# Patient Record
Sex: MALE | Race: BLACK OR AFRICAN AMERICAN | NOT HISPANIC OR LATINO | Employment: STUDENT | URBAN - METROPOLITAN AREA
[De-identification: names, ages, dates, MRNs, and addresses within clinical notes are randomized per-mention and may not be internally consistent; named-entity substitution may affect disease eponyms.]

---

## 2019-03-28 ENCOUNTER — COMMUNICATION - HEALTHEAST (OUTPATIENT)
Dept: UROLOGY | Facility: CLINIC | Age: 19
End: 2019-03-28

## 2019-04-05 ENCOUNTER — COMMUNICATION - HEALTHEAST (OUTPATIENT)
Dept: UROLOGY | Facility: CLINIC | Age: 19
End: 2019-04-05

## 2019-06-05 ENCOUNTER — HOSPITAL ENCOUNTER (EMERGENCY)
Facility: CLINIC | Age: 19
Discharge: HOME | End: 2019-06-06
Attending: EMERGENCY MEDICINE | Admitting: EMERGENCY MEDICINE

## 2019-06-05 ENCOUNTER — APPOINTMENT (OUTPATIENT)
Dept: CT IMAGING | Facility: CLINIC | Age: 19
End: 2019-06-05
Attending: EMERGENCY MEDICINE

## 2019-06-05 DIAGNOSIS — N13.4 HYDROURETER ON RIGHT: ICD-10-CM

## 2019-06-05 DIAGNOSIS — N20.1 URETEROLITHIASIS: ICD-10-CM

## 2019-06-05 LAB
ALBUMIN SERPL-MCNC: 4.5 G/DL (ref 3.4–5)
ALP SERPL-CCNC: 74 U/L (ref 40–150)
ALT SERPL W P-5'-P-CCNC: 27 U/L (ref 0–70)
ANION GAP SERPL CALCULATED.3IONS-SCNC: 4 MMOL/L (ref 3–14)
AST SERPL W P-5'-P-CCNC: 16 U/L (ref 0–45)
BASOPHILS # BLD AUTO: 0.1 10E9/L (ref 0–0.2)
BASOPHILS NFR BLD AUTO: 0.8 %
BILIRUB SERPL-MCNC: 0.7 MG/DL (ref 0.2–1.3)
BUN SERPL-MCNC: 10 MG/DL (ref 7–30)
CALCIUM SERPL-MCNC: 8.8 MG/DL (ref 8.5–10.1)
CHLORIDE SERPL-SCNC: 102 MMOL/L (ref 94–109)
CO2 SERPL-SCNC: 32 MMOL/L (ref 20–32)
CREAT SERPL-MCNC: 0.91 MG/DL (ref 0.66–1.25)
DIFFERENTIAL METHOD BLD: NORMAL
EOSINOPHIL # BLD AUTO: 0.1 10E9/L (ref 0–0.7)
EOSINOPHIL NFR BLD AUTO: 1.4 %
ERYTHROCYTE [DISTWIDTH] IN BLOOD BY AUTOMATED COUNT: 11.7 % (ref 10–15)
GFR SERPL CREATININE-BSD FRML MDRD: >90 ML/MIN/{1.73_M2}
GLUCOSE SERPL-MCNC: 116 MG/DL (ref 70–99)
HCT VFR BLD AUTO: 45 % (ref 40–53)
HGB BLD-MCNC: 14.7 G/DL (ref 13.3–17.7)
IMM GRANULOCYTES # BLD: 0 10E9/L (ref 0–0.4)
IMM GRANULOCYTES NFR BLD: 0.1 %
LIPASE SERPL-CCNC: 71 U/L (ref 73–393)
LYMPHOCYTES # BLD AUTO: 1.5 10E9/L (ref 0.8–5.3)
LYMPHOCYTES NFR BLD AUTO: 21 %
MCH RBC QN AUTO: 29 PG (ref 26.5–33)
MCHC RBC AUTO-ENTMCNC: 32.7 G/DL (ref 31.5–36.5)
MCV RBC AUTO: 89 FL (ref 78–100)
MONOCYTES # BLD AUTO: 0.9 10E9/L (ref 0–1.3)
MONOCYTES NFR BLD AUTO: 12.7 %
NEUTROPHILS # BLD AUTO: 4.5 10E9/L (ref 1.6–8.3)
NEUTROPHILS NFR BLD AUTO: 64 %
NRBC # BLD AUTO: 0 10*3/UL
NRBC BLD AUTO-RTO: 0 /100
PLATELET # BLD AUTO: 254 10E9/L (ref 150–450)
POTASSIUM SERPL-SCNC: 3.3 MMOL/L (ref 3.4–5.3)
PROT SERPL-MCNC: 8.1 G/DL (ref 6.8–8.8)
RBC # BLD AUTO: 5.07 10E12/L (ref 4.4–5.9)
SODIUM SERPL-SCNC: 139 MMOL/L (ref 133–144)
WBC # BLD AUTO: 7.1 10E9/L (ref 4–11)

## 2019-06-05 PROCEDURE — 80053 COMPREHEN METABOLIC PANEL: CPT | Performed by: EMERGENCY MEDICINE

## 2019-06-05 PROCEDURE — 96375 TX/PRO/DX INJ NEW DRUG ADDON: CPT | Performed by: EMERGENCY MEDICINE

## 2019-06-05 PROCEDURE — 74176 CT ABD & PELVIS W/O CONTRAST: CPT

## 2019-06-05 PROCEDURE — 83690 ASSAY OF LIPASE: CPT | Performed by: EMERGENCY MEDICINE

## 2019-06-05 PROCEDURE — 96374 THER/PROPH/DIAG INJ IV PUSH: CPT | Performed by: EMERGENCY MEDICINE

## 2019-06-05 PROCEDURE — 99285 EMERGENCY DEPT VISIT HI MDM: CPT | Mod: 25 | Performed by: EMERGENCY MEDICINE

## 2019-06-05 PROCEDURE — 81001 URINALYSIS AUTO W/SCOPE: CPT | Performed by: EMERGENCY MEDICINE

## 2019-06-05 PROCEDURE — 85025 COMPLETE CBC W/AUTO DIFF WBC: CPT | Performed by: EMERGENCY MEDICINE

## 2019-06-05 PROCEDURE — 96361 HYDRATE IV INFUSION ADD-ON: CPT | Performed by: EMERGENCY MEDICINE

## 2019-06-05 PROCEDURE — 25000128 H RX IP 250 OP 636: Performed by: EMERGENCY MEDICINE

## 2019-06-05 PROCEDURE — 99284 EMERGENCY DEPT VISIT MOD MDM: CPT | Mod: Z6 | Performed by: EMERGENCY MEDICINE

## 2019-06-05 RX ORDER — MORPHINE SULFATE 2 MG/ML
4 INJECTION, SOLUTION INTRAMUSCULAR; INTRAVENOUS ONCE
Status: COMPLETED | OUTPATIENT
Start: 2019-06-05 | End: 2019-06-05

## 2019-06-05 RX ORDER — KETOROLAC TROMETHAMINE 30 MG/ML
30 INJECTION, SOLUTION INTRAMUSCULAR; INTRAVENOUS ONCE
Status: COMPLETED | OUTPATIENT
Start: 2019-06-05 | End: 2019-06-05

## 2019-06-05 RX ORDER — ONDANSETRON 2 MG/ML
4 INJECTION INTRAMUSCULAR; INTRAVENOUS ONCE
Status: COMPLETED | OUTPATIENT
Start: 2019-06-05 | End: 2019-06-05

## 2019-06-05 RX ADMIN — SODIUM CHLORIDE 1000 ML: 9 INJECTION, SOLUTION INTRAVENOUS at 22:58

## 2019-06-05 RX ADMIN — KETOROLAC TROMETHAMINE 30 MG: 30 INJECTION, SOLUTION INTRAMUSCULAR; INTRAVENOUS at 21:59

## 2019-06-05 RX ADMIN — ONDANSETRON 4 MG: 2 INJECTION INTRAMUSCULAR; INTRAVENOUS at 22:01

## 2019-06-05 RX ADMIN — MORPHINE SULFATE 4 MG: 2 INJECTION, SOLUTION INTRAMUSCULAR; INTRAVENOUS at 22:14

## 2019-06-05 RX ADMIN — SODIUM CHLORIDE 1000 ML: 9 INJECTION, SOLUTION INTRAVENOUS at 22:04

## 2019-06-05 ASSESSMENT — ENCOUNTER SYMPTOMS
FEVER: 0
NECK STIFFNESS: 0
ARTHRALGIAS: 0
COLOR CHANGE: 0
DIFFICULTY URINATING: 0
SHORTNESS OF BREATH: 0
HEADACHES: 0
VOMITING: 0
EYE REDNESS: 0
CONFUSION: 0
NAUSEA: 1
ABDOMINAL PAIN: 1
HEMATURIA: 0

## 2019-06-05 ASSESSMENT — MIFFLIN-ST. JEOR: SCORE: 1641.75

## 2019-06-05 NOTE — ED AVS SNAPSHOT
[facility], [place], Emergency Department  [address] [place] [id number]  Phone:  [phone]                                    Brendan Hunt   MRN: [id number]    Department:  [facility], Emergency Department   Date of Visit:  6/5/2019           After Visit Summary Signature Page    I have received my discharge instructions, and my questions have been answered. I have discussed any challenges I see with this plan with the nurse or doctor.    ..........................................................................................................................................  Patient/Patient Representative Signature      ..........................................................................................................................................  Patient Representative Print Name and Relationship to Patient    ..................................................               ................................................  Date                                   Time    ..........................................................................................................................................  Reviewed by Signature/Title    ...................................................              ..............................................  Date                                               Time          22EPIC Rev 08/18

## 2019-06-06 VITALS
HEIGHT: 72 IN | DIASTOLIC BLOOD PRESSURE: 71 MMHG | OXYGEN SATURATION: 99 % | WEIGHT: 132 LBS | HEART RATE: 85 BPM | SYSTOLIC BLOOD PRESSURE: 134 MMHG | TEMPERATURE: 98.3 F | BODY MASS INDEX: 17.88 KG/M2 | RESPIRATION RATE: 16 BRPM

## 2019-06-06 LAB
ALBUMIN UR-MCNC: NEGATIVE MG/DL
AMORPH CRY #/AREA URNS HPF: ABNORMAL /HPF
APPEARANCE UR: ABNORMAL
BILIRUB UR QL STRIP: NEGATIVE
COLOR UR AUTO: YELLOW
GLUCOSE UR STRIP-MCNC: NEGATIVE MG/DL
HGB UR QL STRIP: ABNORMAL
KETONES UR STRIP-MCNC: NEGATIVE MG/DL
LEUKOCYTE ESTERASE UR QL STRIP: NEGATIVE
MUCOUS THREADS #/AREA URNS LPF: PRESENT /LPF
NITRATE UR QL: NEGATIVE
PH UR STRIP: 7 PH (ref 5–7)
RBC #/AREA URNS AUTO: 6 /HPF (ref 0–2)
SOURCE: ABNORMAL
SP GR UR STRIP: 1.01 (ref 1–1.03)
UROBILINOGEN UR STRIP-MCNC: NORMAL MG/DL (ref 0–2)
WBC #/AREA URNS AUTO: 1 /HPF (ref 0–5)

## 2019-06-06 RX ORDER — IBUPROFEN 600 MG/1
600 TABLET, FILM COATED ORAL EVERY 6 HOURS PRN
Qty: 30 TABLET | Refills: 0 | Status: SHIPPED | OUTPATIENT
Start: 2019-06-06 | End: 2022-01-02

## 2019-06-06 RX ORDER — TAMSULOSIN HYDROCHLORIDE 0.4 MG/1
0.4 CAPSULE ORAL DAILY
Qty: 10 CAPSULE | Refills: 0 | Status: SHIPPED | OUTPATIENT
Start: 2019-06-06 | End: 2019-06-16

## 2019-06-06 RX ORDER — HYDROCODONE BITARTRATE AND ACETAMINOPHEN 5; 325 MG/1; MG/1
1 TABLET ORAL EVERY 6 HOURS PRN
Qty: 18 TABLET | Refills: 0 | Status: SHIPPED | OUTPATIENT
Start: 2019-06-06 | End: 2019-06-09

## 2019-06-06 RX ORDER — ONDANSETRON 4 MG/1
4 TABLET, ORALLY DISINTEGRATING ORAL EVERY 8 HOURS PRN
Qty: 10 TABLET | Refills: 0 | Status: SHIPPED | OUTPATIENT
Start: 2019-06-06 | End: 2019-06-09

## 2019-06-06 NOTE — ED PROVIDER NOTES
[place] EMERGENCY DEPARTMENT ([facility])  6/05/19    History     Chief Complaint   Patient presents with     Abdominal Pain     HPI  Brendan Hunt is a 19 year old male with no significant past medical history who presents here to the Emergency Department due to RLQ abdominal pain. Patient states that his pain started today between 12 PM-1 PM. Patient describes the pain as cramping and constant. He is complaining of associated dysuria, testicular pain and nausea. Denies vomiting or hematuria. Patient notes that he has seen many kidney stones in the past but has never seen a urologist for these issues.    I have reviewed the Medications, Allergies, Past Medical and Surgical History, and Social History in the [employer] system.    Past Medical History:   Diagnosis Date     Kidney stones        No past surgical history on file.    No family history on file.    Social History     Tobacco Use     Smoking status: Not on file   Substance Use Topics     Alcohol use: Not on file       No current facility-administered medications for this encounter.      Current Outpatient Medications   Medication     HYDROcodone-acetaminophen (NORCO) 5-325 MG tablet     ibuprofen (ADVIL/MOTRIN) 600 MG tablet     ondansetron (ZOFRAN ODT) 4 MG ODT tab     tamsulosin (FLOMAX) 0.4 MG capsule      No Known Allergies      Review of Systems   Constitutional: Negative for fever.   HENT: Negative for congestion.    Eyes: Negative for redness.   Respiratory: Negative for shortness of breath.    Cardiovascular: Negative for chest pain.   Gastrointestinal: Positive for abdominal pain (RLQ) and nausea. Negative for vomiting.   Genitourinary: Positive for testicular pain. Negative for difficulty urinating and hematuria.   Musculoskeletal: Negative for arthralgias and neck stiffness.   Skin: Negative for color change.   Neurological: Negative for headaches.   Psychiatric/Behavioral: Negative for confusion.       Physical Exam   BP:  141/76  Pulse: 91  Temp: 98.3  F (36.8  C)  Resp: 18  Height: 182.9 cm (6')  Weight: 59.9 kg (132 lb)  SpO2: 100 %      Physical Exam   Constitutional: He appears distressed.   HENT:   Head: Atraumatic.   Mouth/Throat: Oropharynx is clear and moist.   Eyes: Pupils are equal, round, and reactive to light. No scleral icterus.   Cardiovascular: Normal rate, regular rhythm, normal heart sounds and intact distal pulses.   Pulmonary/Chest: Effort normal and breath sounds normal. No respiratory distress.   Abdominal: Soft. Bowel sounds are normal. There is tenderness in the right lower quadrant. There is no rigidity and no guarding.   Musculoskeletal: Normal range of motion. He exhibits no edema or tenderness.   Neurological: He is alert. He has normal strength.   Skin: Skin is warm. No rash noted. He is not diaphoretic.   Psychiatric: He has a normal mood and affect. His behavior is normal.   Nursing note and vitals reviewed.      ED Course   9:20 PM  The patient was seen and examined by Dre Ruelas MD in Room [facility].        Procedures            Critical Care time:  none             Labs Ordered and Resulted from Time of ED Arrival Up to the Time of Departure from the ED   COMPREHENSIVE METABOLIC PANEL - Abnormal; Notable for the following components:       Result Value    Potassium 3.3 (*)     Glucose 116 (*)     All other components within normal limits   LIPASE - Abnormal; Notable for the following components:    Lipase 71 (*)     All other components within normal limits   ROUTINE UA WITH MICROSCOPIC - Abnormal; Notable for the following components:    Blood Urine Moderate (*)     RBC Urine 6 (*)     Mucous Urine Present (*)     Amorphous Crystals Few (*)     All other components within normal limits   CBC WITH PLATELETS DIFFERENTIAL   PERIPHERAL IV CATHETER       Results for orders placed or performed during the hospital encounter of 06/05/19 (from the past 24 hour(s))   CBC with platelets differential   Result Value  Ref Range    WBC 7.1 4.0 - 11.0 10e9/L    RBC Count 5.07 4.4 - 5.9 10e12/L    Hemoglobin 14.7 13.3 - 17.7 g/dL    Hematocrit 45.0 40.0 - 53.0 %    MCV 89 78 - 100 fl    MCH 29.0 26.5 - 33.0 pg    MCHC 32.7 31.5 - 36.5 g/dL    RDW 11.7 10.0 - 15.0 %    Platelet Count 254 150 - 450 10e9/L    Diff Method Automated Method     % Neutrophils 64.0 %    % Lymphocytes 21.0 %    % Monocytes 12.7 %    % Eosinophils 1.4 %    % Basophils 0.8 %    % Immature Granulocytes 0.1 %    Nucleated RBCs 0 0 /100    Absolute Neutrophil 4.5 1.6 - 8.3 10e9/L    Absolute Lymphocytes 1.5 0.8 - 5.3 10e9/L    Absolute Monocytes 0.9 0.0 - 1.3 10e9/L    Absolute Eosinophils 0.1 0.0 - 0.7 10e9/L    Absolute Basophils 0.1 0.0 - 0.2 10e9/L    Abs Immature Granulocytes 0.0 0 - 0.4 10e9/L    Absolute Nucleated RBC 0.0    Comprehensive metabolic panel   Result Value Ref Range    Sodium 139 133 - 144 mmol/L    Potassium 3.3 (L) 3.4 - 5.3 mmol/L    Chloride 102 94 - 109 mmol/L    Carbon Dioxide 32 20 - 32 mmol/L    Anion Gap 4 3 - 14 mmol/L    Glucose 116 (H) 70 - 99 mg/dL    Urea Nitrogen 10 7 - 30 mg/dL    Creatinine 0.91 0.66 - 1.25 mg/dL    GFR Estimate >90 >60 mL/min/[1.73_m2]    GFR Estimate If Black >90 >60 mL/min/[1.73_m2]    Calcium 8.8 8.5 - 10.1 mg/dL    Bilirubin Total 0.7 0.2 - 1.3 mg/dL    Albumin 4.5 3.4 - 5.0 g/dL    Protein Total 8.1 6.8 - 8.8 g/dL    Alkaline Phosphatase 74 40 - 150 U/L    ALT 27 0 - 70 U/L    AST 16 0 - 45 U/L   Lipase   Result Value Ref Range    Lipase 71 (L) 73 - 393 U/L   CT Abdomen Pelvis w/o Contrast    Narrative    PRELIMINARY REPORT - The following report is a preliminary  interpretation.      Impression    IMPRESSION: ] Hydronephrosis of the right kidney, with multiple  calyceal nonobstructing stones measuring up to 1 cm. No definite  obstructing stone is seen within the ureter, however there is a  punctate 3 mm stone in the urinary bladder adjacent to the  ureterovesicular junction, with suggestion of adjacent  soft tissue,  perhaps representing inflammatory tissue, which may be contributing to  right-sided obstructive hydroureteronephrosis. No additional acute  findings within the abdomen or pelvis.    UA with Microscopic   Result Value Ref Range    Color Urine Yellow     Appearance Urine Slightly Cloudy     Glucose Urine Negative NEG^Negative mg/dL    Bilirubin Urine Negative NEG^Negative    Ketones Urine Negative NEG^Negative mg/dL    Specific Gravity Urine 1.014 1.003 - 1.035    Blood Urine Moderate (A) NEG^Negative    pH Urine 7.0 5.0 - 7.0 pH    Protein Albumin Urine Negative NEG^Negative mg/dL    Urobilinogen mg/dL Normal 0.0 - 2.0 mg/dL    Nitrite Urine Negative NEG^Negative    Leukocyte Esterase Urine Negative NEG^Negative    Source Clean catch urine     WBC Urine 1 0 - 5 /HPF    RBC Urine 6 (H) 0 - 2 /HPF    Mucous Urine Present (A) NEG^Negative /LPF    Amorphous Crystals Few (A) NEG^Negative /HPF     Discussed with Urology- will have patient follow-up in clinic.       Assessments & Plan (with Medical Decision Making)   19 year old male with history of kidney stones to the emergency department with right flank and abdominal pain.  The patient does have some hematuria on his urinalysis.  His labs are essentially normal.  He does not have any leukocytosis or elevation of his creatinine level.  The patient has a 3 mm right UVJ stone with severe hydronephrosis and question of soft tissue adjacent to the stone contributing to the hydronephrosis on his abdominal/pelvis CT.  These findings were discussed with urology.  His symptoms are well controlled so will manage an outpatient with ibuprofen, Norco, Flomax, and ondansetron.  The patient will strain his urine and save stone and take to urology follow-up.  Patient given contact information for urology clinic to arrange outpatient follow-up.    I have reviewed the nursing notes.    I have reviewed the findings, diagnosis, plan and need for follow up with the  patient.       Medication List      Started    HYDROcodone-acetaminophen 5-325 MG tablet  Commonly known as:  NORCO  1 tablet, Oral, EVERY 6 HOURS PRN     ibuprofen 600 MG tablet  Commonly known as:  ADVIL/MOTRIN  600 mg, Oral, EVERY 6 HOURS PRN     ondansetron 4 MG ODT tab  Commonly known as:  ZOFRAN ODT  4 mg, Oral, EVERY 8 HOURS PRN     tamsulosin 0.4 MG capsule  Commonly known as:  FLOMAX  0.4 mg, Oral, DAILY            Final diagnoses:   Ureterolithiasis   Hydroureter on right     IMart, am serving as a trained medical scribe to document services personally performed by Dre Ruelas MD, based on the provider's statements to me.   I, Dre Ruelas MD, was physically present and have reviewed and verified the accuracy of this note documented by Mart Hou.    6/5/2019   [facility], [place], EMERGENCY DEPARTMENT     Dre Ruelas MD  06/06/19 0155

## 2019-06-06 NOTE — ED TRIAGE NOTES
H/o kidney stones, was seen at [facility]  Never followed up with urology    Today c/o right sided abdominal pain and right flank pain  Nausea    States he has not taken anything for pain

## 2019-06-06 NOTE — DISCHARGE INSTRUCTIONS
Take ibuprofen as needed for pain.  Take Norco as needed for pain not controlled by ibuprofen.  Do not drive for 8 hours after taking Norco.  Take flomax as directed.  Take ondansetron as needed for nausea.  Strain urine, save stone, and take to Urology follow-up appointment.    Please make an appointment to follow up with Urology Clinic (phone: [phone]) in 1-2 weeks.

## 2019-12-16 ENCOUNTER — OFFICE VISIT - HEALTHEAST (OUTPATIENT)
Dept: INTERNAL MEDICINE | Facility: CLINIC | Age: 19
End: 2019-12-16

## 2019-12-16 DIAGNOSIS — R01.1 HEART MURMUR: ICD-10-CM

## 2019-12-16 DIAGNOSIS — N20.0 NEPHROLITHIASIS: ICD-10-CM

## 2019-12-16 DIAGNOSIS — G43.109 MIGRAINE WITH AURA AND WITHOUT STATUS MIGRAINOSUS, NOT INTRACTABLE: ICD-10-CM

## 2019-12-16 ASSESSMENT — MIFFLIN-ST. JEOR: SCORE: 1552.09

## 2019-12-30 ENCOUNTER — HOSPITAL ENCOUNTER (OUTPATIENT)
Dept: CARDIOLOGY | Facility: CLINIC | Age: 19
Discharge: HOME | End: 2019-12-30
Attending: INTERNAL MEDICINE

## 2019-12-30 DIAGNOSIS — R01.1 HEART MURMUR: ICD-10-CM

## 2019-12-30 LAB
AORTIC ROOT: 2.8 CM
AORTIC VALVE MEAN VELOCITY: 77.3 CM/S
ASCENDING AORTA: 2.2 CM
AV DIMENSIONLESS INDEX VTI: 1
AV MEAN GRADIENT: 3 MMHG
AV PEAK GRADIENT: 5.5 MMHG
AV VALVE AREA: 2.5 CM2
AV VELOCITY RATIO: 1
BSA FOR ECHO PROCEDURE: 1.69 M2
DOP CALC AO PEAK VEL: 117 CM/S
DOP CALC AO VTI: 23.3 CM
DOP CALC LVOT AREA: 2.54 CM2
DOP CALC LVOT DIAMETER: 1.8 CM
DOP CALC LVOT PEAK VEL: 120 CM/S
DOP CALC LVOT STROKE VOLUME: 58.5 CM3
DOP CALCLVOT PEAK VEL VTI: 23 CM
EJECTION FRACTION: 62 % (ref 55–75)
FRACTIONAL SHORTENING: 39.4 % (ref 28–44)
INTERVENTRICULAR SEPTUM IN END DIASTOLE: 0.87 CM (ref 0.6–1)
IVS/PW RATIO: 1
LA AREA 1: 10.1 CM2
LA AREA 2: 9.9 CM2
LEFT ATRIUM LENGTH: 4.2 CM
LEFT ATRIUM SIZE: 2.6 CM
LEFT ATRIUM TO AORTIC ROOT RATIO: 0.93 NO UNITS
LEFT ATRIUM VOLUME INDEX: 12 ML/M2
LEFT ATRIUM VOLUME: 20.2 ML
LEFT VENTRICLE CARDIAC INDEX: 2.6 L/MIN/M2
LEFT VENTRICLE CARDIAC OUTPUT: 4.3 L/MIN
LEFT VENTRICLE DIASTOLIC VOLUME INDEX: 43.5 CM3/M2 (ref 34–74)
LEFT VENTRICLE DIASTOLIC VOLUME: 73.5 CM3 (ref 62–150)
LEFT VENTRICLE HEART RATE: 74 BPM
LEFT VENTRICLE MASS INDEX: 59.5 G/M2
LEFT VENTRICLE SYSTOLIC VOLUME INDEX: 16.3 CM3/M2 (ref 11–31)
LEFT VENTRICLE SYSTOLIC VOLUME: 27.6 CM3 (ref 21–61)
LEFT VENTRICULAR INTERNAL DIMENSION IN DIASTOLE: 3.88 CM (ref 4.2–5.8)
LEFT VENTRICULAR INTERNAL DIMENSION IN SYSTOLE: 2.35 CM (ref 2.5–4)
LEFT VENTRICULAR MASS: 100.5 G
LEFT VENTRICULAR OUTFLOW TRACT MEAN GRADIENT: 3 MMHG
LEFT VENTRICULAR OUTFLOW TRACT MEAN VELOCITY: 76.5 CM/S
LEFT VENTRICULAR OUTFLOW TRACT PEAK GRADIENT: 6 MMHG
LEFT VENTRICULAR POSTERIOR WALL IN END DIASTOLE: 0.88 CM (ref 0.6–1)
LV STROKE VOLUME INDEX: 34.6 ML/M2
MITRAL VALVE DECELERATION SLOPE: 5040 MM/S2
MITRAL VALVE E/A RATIO: 1.9
MITRAL VALVE PRESSURE HALF-TIME: 60 MS
MV AVERAGE E/E' RATIO: 7 CM/S
MV DECELERATION TIME: 204 MS
MV E'TISSUE VEL-LAT: 16.4 CM/S
MV E'TISSUE VEL-MED: 13.1 CM/S
MV LATERAL E/E' RATIO: 6.3
MV MEDIAL E/E' RATIO: 7.9
MV PEAK A VELOCITY: 55.3 CM/S
MV PEAK E VELOCITY: 103 CM/S
MV VALVE AREA PRESSURE 1/2 METHOD: 3.7 CM2
TRICUSPID REGURGITATION PEAK PRESSURE GRADIENT: 21.7 MMHG
TRICUSPID VALVE ANULAR PLANE SYSTOLIC EXCURSION: 2 CM
TRICUSPID VALVE PEAK REGURGITANT VELOCITY: 233 CM/S

## 2020-01-08 ENCOUNTER — COMMUNICATION - HEALTHEAST (OUTPATIENT)
Dept: INTERNAL MEDICINE | Facility: CLINIC | Age: 20
End: 2020-01-08

## 2020-02-12 ENCOUNTER — OFFICE VISIT - HEALTHEAST (OUTPATIENT)
Dept: INTERNAL MEDICINE | Facility: CLINIC | Age: 20
End: 2020-02-12

## 2020-02-12 DIAGNOSIS — G43.109 MIGRAINE WITH AURA AND WITHOUT STATUS MIGRAINOSUS, NOT INTRACTABLE: ICD-10-CM

## 2020-02-12 DIAGNOSIS — F41.1 GAD (GENERALIZED ANXIETY DISORDER): ICD-10-CM

## 2020-02-12 DIAGNOSIS — N20.0 NEPHROLITHIASIS: ICD-10-CM

## 2020-02-12 LAB
ALBUMIN UR-MCNC: NEGATIVE MG/DL
APPEARANCE UR: CLEAR
BILIRUB UR QL STRIP: NEGATIVE
COLOR UR AUTO: YELLOW
GLUCOSE UR STRIP-MCNC: NEGATIVE MG/DL
HGB UR QL STRIP: NEGATIVE
KETONES UR STRIP-MCNC: NEGATIVE MG/DL
LEUKOCYTE ESTERASE UR QL STRIP: NEGATIVE
NITRATE UR QL: NEGATIVE
PH UR STRIP: 7 [PH] (ref 5–8)
SP GR UR STRIP: 1.02 (ref 1–1.03)
UROBILINOGEN UR STRIP-ACNC: NORMAL

## 2020-02-12 ASSESSMENT — MIFFLIN-ST. JEOR: SCORE: 1556.45

## 2021-05-27 NOTE — TELEPHONE ENCOUNTER
Message left for patient to call clinic to set up an appointment for kidney stone follow-up for today or tomorrow.  Marleen Caro RN

## 2021-06-04 VITALS
DIASTOLIC BLOOD PRESSURE: 60 MMHG | WEIGHT: 122.4 LBS | HEIGHT: 70 IN | SYSTOLIC BLOOD PRESSURE: 118 MMHG | HEART RATE: 74 BPM | OXYGEN SATURATION: 98 % | BODY MASS INDEX: 17.52 KG/M2

## 2021-06-04 VITALS
DIASTOLIC BLOOD PRESSURE: 72 MMHG | HEART RATE: 60 BPM | WEIGHT: 121.44 LBS | OXYGEN SATURATION: 100 % | SYSTOLIC BLOOD PRESSURE: 112 MMHG | HEIGHT: 70 IN | BODY MASS INDEX: 17.38 KG/M2

## 2021-06-04 NOTE — PROGRESS NOTES
ASSESSMENT AND PLAN:    1. Nephrolithiasis  Asymptomatic.     2. Migraine with aura   His history and exam strongly suggest new onset migraine with aura.  Suspect anxiety disorder and 'stress' play a role.  Will treat as follows:    - escitalopram oxalate (LEXAPRO) 10 MG tablet; Take 1 tablet (10 mg total) by mouth daily.  Dispense: 30 tablet; Refill: 2  - SUMAtriptan (IMITREX) 50 MG tablet; Take 1 tablet (50 mg total) by mouth once as needed for migraine. May repeat once in two hours as needed, no more than 2 in 24 hours.  Dispense: 30 tablet; Refill: 2    3. Heart murmur  Noted on exam today.  Possibly a flow murmur.  Can't exclude a mitral valve murmur which could play a role with migraine.  Will get echocardiogram.   - Echo Complete; Future    Patient Instructions   1. take escitalopram 10 mg po daily in the evening.     2. Use sumatriptan 50 mg by mouth with headache, can repeat in 2 hours.  No more than 2 pills in 24 hours.     3. Echocardiogram.    4.follow up in 2 weeks.     CHIEF COMPLAINT:  Chief Complaint   Patient presents with     Headache     Dizziness     HISTORY OF PRESENT ILLNESS:  Brendan Hunt is a 19 y.o. male presents with concern about headaches.  He has been suffering them for about a year.  They occur almost every other day, and always on awakening.  He feels them on the occipital area mostly, they throb and there are also visual symptoms - moving flashing lights and at times they almost block his vision.  He will feel diaphoresis and nauseated as well.  They resolve, in 1-2 hours with no treatment.  He does not get arm or leg symptoms of numbness or weakness.  He did vomit once.  He does also have fitful sleep and difficulty falling asleep.  He drinks tea daily but no caffeine or alcohol or drug use.  He reports that these headaches limit currently his ability to live his life.      His mother is present with  and they all suggest possibly a family history of migraine in an  "uncle.     REVIEW OF SYSTEMS:   See HPI, all other systems on review are negative.    Past Medical History:   Diagnosis Date     Headache 12/16/2019     Migraine with aura and without status migrainosus, not intractable 12/16/2019     Nephrolithiasis 12/16/2019     Social History     Tobacco Use   Smoking Status Current Every Day Smoker   Smokeless Tobacco Never Used     VITALS:  Vitals:    12/16/19 1331   BP: 112/72   Patient Site: Left Arm   Patient Position: Sitting   Cuff Size: Adult Regular   Pulse: 60   SpO2: 100%   Weight: 121 lb 7 oz (55.1 kg)   Height: 5' 10\" (1.778 m)     Wt Readings from Last 3 Encounters:   12/16/19 121 lb 7 oz (55.1 kg)   03/27/19 123 lb (55.8 kg)     PHYSICAL EXAM:  Constitutional:  In NAD, alert and oriented  HEENT: nose and throat clear, ears normal  EYE: fundi are unremarkable, discs flat  Neck: no significant cervical or axillary adenopathy  Cardiac:  S1 S2 with a soft murmur, sounds late systolic  Lungs: Clear   Abdomen:   Soft, flat and non-tender, without guarding, rebound, or mass.    Neurologic:  Speech clear, no arm or leg weakness, gait normal , romberg is normal, cranial nerves are normal.   Psychiatric:  Mood and behavior appropriate, thinking is clear.     DECISION TO OBTAIN OLD RECORDS AND/OR OBTAIN HISTORY FROM SOMEONE OTHER THAN PATIENT, AND/OR ACCESSING CARE EVERYWHERE):  1  0     REVIEW AND SUMMARIZATION OF OLD RECORDS, AND/OR OBTAINING HISTORY FROM SOMEONE OTHER THAN PATIENT, AND/OR DISCUSSION OF CASE WITH ANOTHER HEALTH CARE PROVIDER:  2 reviewed 9/23/2019 ER evaluation of headaches    REVIEW AND/OR ORDER OF OF CLINICAL LAB TESTS: 1  Reviewed lab testing.     REVIEW AND/OR ORDER OF RADIOLOGY TESTS: 1 reviewed CT head  9/24/2019    REVIEW AND/OR ORDER OF MEDICAL TESTS (EKG/ECHO/COLONOSCOPY/EGD): 1 0    INDEPENDENT  VISUALIZATION OF IMAGE, TRACING, OR SPECIMEN ITSELF (2 EACH):  0     TOTAL: 4    Current Outpatient Medications   Medication Sig Dispense Refill     " escitalopram oxalate (LEXAPRO) 10 MG tablet Take 1 tablet (10 mg total) by mouth daily. 30 tablet 2     oxyCODONE-acetaminophen (PERCOCET/ENDOCET) 5-325 mg per tablet Take 1 tablet by mouth every 6 (six) hours as needed for pain. 13 tablet 0     SUMAtriptan (IMITREX) 50 MG tablet Take 1 tablet (50 mg total) by mouth once as needed for migraine. May repeat once in two hours as needed, no more than 2 in 24 hours. 30 tablet 2     No current facility-administered medications for this visit.      David Pierre MD  Internal Medicine  [facility]

## 2021-06-04 NOTE — PATIENT INSTRUCTIONS - HE
1. take escitalopram 10 mg po daily in the evening.     2. Use sumatriptan 50 mg by mouth with headache, can repeat in 2 hours.  No more than 2 pills in 24 hours.     3. Echocardiogram.    4.follow up in 2 weeks.

## 2021-06-06 NOTE — PATIENT INSTRUCTIONS - HE
1. Take escitalopram 10 mg by mouth daily.     2. Take mobic 7.5 mg one pill daily as needed for pain.     3. Make an appointment to see urology about kidney stones.     4. Follow up with me in 1 months.

## 2021-06-16 PROBLEM — N20.0 NEPHROLITHIASIS: Status: ACTIVE | Noted: 2019-12-16

## 2021-06-16 PROBLEM — G43.109 MIGRAINE WITH AURA AND WITHOUT STATUS MIGRAINOSUS, NOT INTRACTABLE: Status: ACTIVE | Noted: 2019-12-16

## 2021-06-20 NOTE — LETTER
Letter by David Pierre MD at      Author: David Pierre MD Service: -- Author Type: --    Filed:  Encounter Date: 1/8/2020 Status: Signed         Brendan Hunt  [address] [place] [address] [address]  [facility] [address]     January 8, 2020     Dear Mr. Hunt,    Below are the results from your recent visit:    Resulted Orders   Echo Complete   Result Value Ref Range    LV volume diastolic 73.5 62 - 150 cm3    LV volume systolic 27.6 21 - 61 cm3    IVSd 0.871 0.6 - 1.0 cm    LVIDd 3.88 (!) 4.2 - 5.8 cm    LVIDs 2.35 (!) 2.5 - 4.0 cm    LVOT diam 1.8 cm    LVOT mean gradient 3 mmHg    LVOT peak VTI 23 cm    LVOT mean ivan 76.5 cm/s    LVOT peak ivan 120 cm/s    LVOT peak gradient 6 mmHg    LV PWd 0.879 0.6 - 1.0 cm    MV E' lat ivan 16.4 cm/s    MV E' med ivan 13.1 cm/s    AV mean ivan 77.3 cm/s    AV mean gradient 3 mmHg    AV VTI 23.3 cm    AV peak ivan 117 cm/s    AO root 2.8 cm    AO ascending 2.2 cm    LA size 2.6 cm    LA/AO root ratio 0.929 no units    MV decel slope 5,040 mm/s2    MV decel time 204 ms    MV P 1/2 time 60 ms    MV peak A ivan 55.3 cm/s    MV peak E ivan 103 cm/s    TR peak ivan 233 cm/s    IVS/PW ratio 1.0     TR peak gradent 21.7 mmHg    LV FS 39.4 28 - 44 %    Echo LVEF calculated 62 55 - 75 %    LA volume 20.2 mL    LV mass 100.5 g    AV area 2.5 cm2    AV DIM IND ivan 1.0     MV area p 1/2 time 3.7 cm2    MV E/A Ratio 1.9     LVOT area 2.54 cm2    LVOT SV 58.5 cm3    AV peak gradient 5.5 mmHg    LV systolic volume index 16.3 11 - 31 cm3/m2    LV diastolic volume index 43.5 34 - 74 cm3/m2    LA volume index 12.0 mL/m2    LV mass index 59.5 g/m2    BSA 1.69 m2    LV SVi 34.6 ml/m2    TAPSE 2.0 cm    MV med E/e' ratio 7.9     MV lat E/e' ratio 6.3     HR 74 bpm    LV CO 4.3 l/min    LV Ci 2.6 l/min/m2    LA area 2 9.9 cm2    LA area 1 10.1 cm2    MV Avg E/e' Ratio 7.0 cm/s    LA length 4.2 cm    AV DIM IND VTI 1.0     Narrative    1. Normal left ventricular size and systolic performance with a  visually   estimated ejection fraction of 65%.   2. No significant valvular heart disease is identified on this study.   3. Normal right ventricular size and systolic performance.        Your heart ultrasound is normal.  There is no concern about a murmur.  No further testing is needed.     Please call with questions or contact us using MyChart.    Sincerely,        Electronically signed by David Pierre MD

## 2021-12-20 ENCOUNTER — HOSPITAL ENCOUNTER (EMERGENCY)
Facility: CLINIC | Age: 21
Discharge: HOME | End: 2021-12-21
Attending: EMERGENCY MEDICINE | Admitting: EMERGENCY MEDICINE
Payer: COMMERCIAL

## 2021-12-20 DIAGNOSIS — N20.1 URETERAL STONE: ICD-10-CM

## 2021-12-20 DIAGNOSIS — N20.0 NEPHROLITHIASIS: ICD-10-CM

## 2021-12-20 PROCEDURE — 96361 HYDRATE IV INFUSION ADD-ON: CPT

## 2021-12-20 PROCEDURE — 96374 THER/PROPH/DIAG INJ IV PUSH: CPT

## 2021-12-20 PROCEDURE — 96375 TX/PRO/DX INJ NEW DRUG ADDON: CPT

## 2021-12-20 PROCEDURE — 99285 EMERGENCY DEPT VISIT HI MDM: CPT | Mod: 25

## 2021-12-20 RX ORDER — KETOROLAC TROMETHAMINE 15 MG/ML
15 INJECTION, SOLUTION INTRAMUSCULAR; INTRAVENOUS ONCE
Status: COMPLETED | OUTPATIENT
Start: 2021-12-20 | End: 2021-12-21

## 2021-12-20 RX ORDER — KETOROLAC TROMETHAMINE 15 MG/ML
15 INJECTION, SOLUTION INTRAMUSCULAR; INTRAVENOUS ONCE
Status: DISCONTINUED | OUTPATIENT
Start: 2021-12-20 | End: 2021-12-21 | Stop reason: HOSPADM

## 2021-12-20 ASSESSMENT — ENCOUNTER SYMPTOMS
FEVER: 0
VOMITING: 1
DYSURIA: 1
FLANK PAIN: 1
HEMATURIA: 0

## 2021-12-21 ENCOUNTER — APPOINTMENT (OUTPATIENT)
Dept: CT IMAGING | Facility: CLINIC | Age: 21
End: 2021-12-21
Attending: EMERGENCY MEDICINE
Payer: COMMERCIAL

## 2021-12-21 VITALS
DIASTOLIC BLOOD PRESSURE: 84 MMHG | TEMPERATURE: 98.7 F | SYSTOLIC BLOOD PRESSURE: 135 MMHG | RESPIRATION RATE: 16 BRPM | HEART RATE: 81 BPM | OXYGEN SATURATION: 100 %

## 2021-12-21 LAB
ALBUMIN UR-MCNC: 20 MG/DL
AMORPH CRY #/AREA URNS HPF: ABNORMAL /HPF
ANION GAP SERPL CALCULATED.3IONS-SCNC: 8 MMOL/L (ref 3–14)
APPEARANCE UR: ABNORMAL
BASOPHILS # BLD AUTO: 0.1 10E3/UL (ref 0–0.2)
BASOPHILS NFR BLD AUTO: 0 %
BILIRUB UR QL STRIP: NEGATIVE
BUN SERPL-MCNC: 13 MG/DL (ref 7–30)
CALCIUM SERPL-MCNC: 8.3 MG/DL (ref 8.5–10.1)
CHLORIDE BLD-SCNC: 103 MMOL/L (ref 94–109)
CO2 SERPL-SCNC: 27 MMOL/L (ref 20–32)
COLOR UR AUTO: ABNORMAL
CREAT SERPL-MCNC: 0.85 MG/DL (ref 0.66–1.25)
EOSINOPHIL # BLD AUTO: 0.1 10E3/UL (ref 0–0.7)
EOSINOPHIL NFR BLD AUTO: 0 %
ERYTHROCYTE [DISTWIDTH] IN BLOOD BY AUTOMATED COUNT: 11.7 % (ref 10–15)
GFR SERPL CREATININE-BSD FRML MDRD: >90 ML/MIN/1.73M2
GLUCOSE BLD-MCNC: 150 MG/DL (ref 70–99)
GLUCOSE UR STRIP-MCNC: NEGATIVE MG/DL
HCT VFR BLD AUTO: 44 % (ref 40–53)
HGB BLD-MCNC: 14.5 G/DL (ref 13.3–17.7)
HGB UR QL STRIP: NEGATIVE
IMM GRANULOCYTES # BLD: 0 10E3/UL
IMM GRANULOCYTES NFR BLD: 0 %
KETONES UR STRIP-MCNC: NEGATIVE MG/DL
LEUKOCYTE ESTERASE UR QL STRIP: NEGATIVE
LYMPHOCYTES # BLD AUTO: 1.3 10E3/UL (ref 0.8–5.3)
LYMPHOCYTES NFR BLD AUTO: 11 %
MCH RBC QN AUTO: 29.5 PG (ref 26.5–33)
MCHC RBC AUTO-ENTMCNC: 33 G/DL (ref 31.5–36.5)
MCV RBC AUTO: 90 FL (ref 78–100)
MONOCYTES # BLD AUTO: 1 10E3/UL (ref 0–1.3)
MONOCYTES NFR BLD AUTO: 8 %
MUCOUS THREADS #/AREA URNS LPF: PRESENT /LPF
NEUTROPHILS # BLD AUTO: 9.5 10E3/UL (ref 1.6–8.3)
NEUTROPHILS NFR BLD AUTO: 81 %
NITRATE UR QL: NEGATIVE
NRBC # BLD AUTO: 0 10E3/UL
NRBC BLD AUTO-RTO: 0 /100
PH UR STRIP: 6 [PH] (ref 5–7)
PLATELET # BLD AUTO: 211 10E3/UL (ref 150–450)
POTASSIUM BLD-SCNC: 3.4 MMOL/L (ref 3.4–5.3)
RBC # BLD AUTO: 4.91 10E6/UL (ref 4.4–5.9)
RBC URINE: 14 /HPF
SODIUM SERPL-SCNC: 138 MMOL/L (ref 133–144)
SP GR UR STRIP: 1.02 (ref 1–1.03)
UROBILINOGEN UR STRIP-MCNC: NORMAL MG/DL
WBC # BLD AUTO: 11.9 10E3/UL (ref 4–11)
WBC URINE: 2 /HPF

## 2021-12-21 PROCEDURE — 36415 COLL VENOUS BLD VENIPUNCTURE: CPT | Performed by: EMERGENCY MEDICINE

## 2021-12-21 PROCEDURE — 250N000013 HC RX MED GY IP 250 OP 250 PS 637: Performed by: EMERGENCY MEDICINE

## 2021-12-21 PROCEDURE — 250N000011 HC RX IP 250 OP 636: Performed by: EMERGENCY MEDICINE

## 2021-12-21 PROCEDURE — 258N000003 HC RX IP 258 OP 636: Performed by: EMERGENCY MEDICINE

## 2021-12-21 PROCEDURE — 85025 COMPLETE CBC W/AUTO DIFF WBC: CPT | Performed by: EMERGENCY MEDICINE

## 2021-12-21 PROCEDURE — 81001 URINALYSIS AUTO W/SCOPE: CPT | Performed by: EMERGENCY MEDICINE

## 2021-12-21 PROCEDURE — 80048 BASIC METABOLIC PNL TOTAL CA: CPT | Performed by: EMERGENCY MEDICINE

## 2021-12-21 PROCEDURE — 74176 CT ABD & PELVIS W/O CONTRAST: CPT

## 2021-12-21 RX ORDER — ONDANSETRON 4 MG/1
4 TABLET, ORALLY DISINTEGRATING ORAL EVERY 8 HOURS PRN
Qty: 10 TABLET | Refills: 0 | Status: SHIPPED | OUTPATIENT
Start: 2021-12-21 | End: 2021-12-24

## 2021-12-21 RX ORDER — OXYCODONE HYDROCHLORIDE 5 MG/1
5 TABLET ORAL EVERY 6 HOURS PRN
Qty: 6 TABLET | Refills: 0 | Status: SHIPPED | OUTPATIENT
Start: 2021-12-21

## 2021-12-21 RX ORDER — TAMSULOSIN HYDROCHLORIDE 0.4 MG/1
0.4 CAPSULE ORAL DAILY
Qty: 10 CAPSULE | Refills: 0 | Status: SHIPPED | OUTPATIENT
Start: 2021-12-21 | End: 2021-12-31

## 2021-12-21 RX ORDER — ONDANSETRON 2 MG/ML
4 INJECTION INTRAMUSCULAR; INTRAVENOUS ONCE
Status: COMPLETED | OUTPATIENT
Start: 2021-12-21 | End: 2021-12-21

## 2021-12-21 RX ORDER — ACETAMINOPHEN 500 MG
1000 TABLET ORAL ONCE
Status: COMPLETED | OUTPATIENT
Start: 2021-12-21 | End: 2021-12-21

## 2021-12-21 RX ORDER — HYDROMORPHONE HYDROCHLORIDE 1 MG/ML
0.5 INJECTION, SOLUTION INTRAMUSCULAR; INTRAVENOUS; SUBCUTANEOUS ONCE
Status: COMPLETED | OUTPATIENT
Start: 2021-12-21 | End: 2021-12-21

## 2021-12-21 RX ADMIN — HYDROMORPHONE HYDROCHLORIDE 0.5 MG: 1 INJECTION, SOLUTION INTRAMUSCULAR; INTRAVENOUS; SUBCUTANEOUS at 01:48

## 2021-12-21 RX ADMIN — SODIUM CHLORIDE 1000 ML: 9 INJECTION, SOLUTION INTRAVENOUS at 00:11

## 2021-12-21 RX ADMIN — ACETAMINOPHEN 1000 MG: 500 TABLET, FILM COATED ORAL at 02:58

## 2021-12-21 RX ADMIN — ONDANSETRON 4 MG: 2 INJECTION INTRAMUSCULAR; INTRAVENOUS at 01:48

## 2021-12-21 RX ADMIN — KETOROLAC TROMETHAMINE 15 MG: 15 INJECTION, SOLUTION INTRAMUSCULAR; INTRAVENOUS at 00:12

## 2021-12-21 NOTE — ED PROVIDER NOTES
"  History   Chief Complaint:  Flank Pain     HPI   Brendan Newsome is a 21 year old male with history of kidney stone with surgical intervention who presents with right sided flank pain with radiation into his testicles that started about 6 hours ago while he was sitting. He has had several episodes of emesis since onset. States that he took pain medication similar to what he \"takes for headaches\" today. The patient adds that he developed some dysuria yesterday. He mentions that he has had this pain a number of times before that required surgery \"to take out rocks\". Denies any fevers or hematuria. Reports that he usually gets his care from a doctor over the phone.     Review of Systems   Constitutional: Negative for fever.   Gastrointestinal: Positive for vomiting.   Genitourinary: Positive for dysuria, flank pain and testicular pain. Negative for hematuria.   All other systems reviewed and are negative.    Allergies:  The patient has no known allergies.     Medications:  The patient is not currently taking any prescribed medications.    Past Medical History:     Kidney stones    Past Surgical History:    Kidney stone removal    Social History:  The patient presents to the ED with male visitor.     Physical Exam     Patient Vitals for the past 24 hrs:   BP Temp Temp src Pulse Resp SpO2   12/21/21 0538 135/84 98.7  F (37.1  C) -- 81 16 100 %   12/21/21 0515 -- -- -- -- -- 100 %   12/21/21 0500 -- -- -- -- -- 99 %   12/21/21 0445 -- -- -- -- -- 99 %   12/21/21 0430 -- -- -- -- -- 100 %   12/21/21 0415 -- -- -- -- -- 100 %   12/21/21 0400 -- -- -- -- -- 99 %   12/21/21 0345 -- -- -- -- -- 99 %   12/21/21 0330 -- -- -- -- -- 99 %   12/21/21 0315 -- -- -- -- -- 99 %   12/21/21 0300 -- -- -- -- -- 96 %   12/21/21 0245 -- -- -- -- -- 99 %   12/21/21 0230 -- -- -- -- -- 99 %   12/21/21 0215 -- -- -- -- -- 100 %   12/21/21 0200 -- -- -- -- -- 100 %   12/21/21 0145 -- -- -- -- -- 98 %   12/21/21 0130 -- -- -- -- -- 100 % "   12/21/21 0115 -- -- -- -- -- 100 %   12/21/21 0100 -- -- -- -- -- 100 %   12/21/21 0045 -- -- -- -- -- 95 %   12/21/21 0030 -- -- -- -- -- 100 %   12/20/21 2304 138/83 98.2  F (36.8  C) Oral 91 20 99 %       Physical Exam  General:              Well-nourished              Speaking in full sentences  Eyes:              Conjunctiva without injection or scleral icterus  ENT:              Moist mucous membranes              Nares patent              Pinnae normal  Neck:              Full ROM              No stiffness appreciated  Resp:              Lungs CTAB              No crackles, wheezing or audible rubs              Good air movement  CV:                    Normal rate, regular rhythm              S1 and S2 present              No murmur, gallop or rub  GI:              BS present              Abdomen soft without distention              Non-tender to light and deep palpation              Mild R CVA tenderness              No guarding or rebound tenderness  :              Circumcised male              No focal testicular tenderness              No testicular or scrotal swelling              Testicles in vertical lie bilaterally              No overlying skin changes  Skin:              Warm, dry, well perfused              No rashes or open wounds on exposed skin  MSK:              Moves all extremities              No focal deformities or swelling  Neuro:              Alert              Answers questions appropriately              Moves all extremities equally              Gait stable  Psych:              Normal affect, normal mood    Emergency Department Course     Imaging:  CT Abdomen Pelvis w/o Contrast   Final Result   IMPRESSION:    1.  3 mm stone distal right ureter near the UVJ with significant right-sided hydronephrosis. Additional stones within dilated calyces in the right kidney with additional stone material within the right aspect of the urinary bladder.      2.  Constipation.           Report per  radiology    Laboratory:  Labs Ordered and Resulted from Time of ED Arrival to Time of ED Departure   BASIC METABOLIC PANEL - Abnormal       Result Value    Sodium 138      Potassium 3.4      Chloride 103      Carbon Dioxide (CO2) 27      Anion Gap 8      Urea Nitrogen 13      Creatinine 0.85      Calcium 8.3 (*)     Glucose 150 (*)     GFR Estimate >90     ROUTINE UA WITH MICROSCOPIC REFLEX TO CULTURE - Abnormal    Color Urine Light Yellow      Appearance Urine Slightly Cloudy (*)     Glucose Urine Negative      Bilirubin Urine Negative      Ketones Urine Negative      Specific Gravity Urine 1.024      Blood Urine Negative      pH Urine 6.0      Protein Albumin Urine 20  (*)     Urobilinogen Urine Normal      Nitrite Urine Negative      Leukocyte Esterase Urine Negative      Mucus Urine Present (*)     Amorphous Crystals Urine Few (*)     RBC Urine 14 (*)     WBC Urine 2     CBC WITH PLATELETS AND DIFFERENTIAL - Abnormal    WBC Count 11.9 (*)     RBC Count 4.91      Hemoglobin 14.5      Hematocrit 44.0      MCV 90      MCH 29.5      MCHC 33.0      RDW 11.7      Platelet Count 211      % Neutrophils 81      % Lymphocytes 11      % Monocytes 8      % Eosinophils 0      % Basophils 0      % Immature Granulocytes 0      NRBCs per 100 WBC 0      Absolute Neutrophils 9.5 (*)     Absolute Lymphocytes 1.3      Absolute Monocytes 1.0      Absolute Eosinophils 0.1      Absolute Basophils 0.1      Absolute Immature Granulocytes 0.0      Absolute NRBCs 0.0          Emergency Department Course:    Reviewed:  I reviewed nursing notes and vitals    Assessments:  2319 I obtained history and examined the patient as noted above.     0041 I rechecked the patient and explained findings.     0251 I rechecked and updated the patient.     0538 I rechecked the patient.     Interventions:  0011 NS 1L IV  0012 Toradol 15 mg IV  0148 Zofran 4 mg IV  0148 Dilaudid 0.5 mg IV  0258 Tylenol 1000 mg PO    Disposition:  The patient was discharged  to home.     Impression & Plan     Medical Decision Making:  Brendan Newsome is a 21 year old male who presented to the ER for evaluation of flank pain and abdominal pain.  Vital signs on presentation reveal mildly elevated BP, though are otherwise unremarkable.  Differential diagnosis includes nephrolithiasis/renal colic, pyelonephritis, appendicitis, AAA, biliary colic, bowel obstruction, colitis, renal infarction, retroperitoneal disease, and testicular pathology such as torsion, epididymitis, hernia.       Patient's current presentation is felt to be most consistent with renal colic. CT confirms a 3 mm ureteral stone at the UVJ on the right. Patient informed of incidentally noted nephrolithiasis. Renal function is normal/baseline.  CT and lab workup show no other alternative etiology that could be causing his symptoms (e.g., AAA, appendicitis, pyelonephritis). There is no fever or convincing evidence of a urinary tract infection.      On recheck, his pain is controlled with interventions in the ED and he is tolerating POs. I will prescribe supportive medications and Flomax to facilitate stone passage. I have advised him to return for uncontrolled pain, vomiting, fever, or any other concerning symptoms. I also advised to strain his urine to look for a stone and submit it to his primary doctor for lab analysis.  Finally, I have advised follow up with urology within 3-5 days.      Diagnosis:    ICD-10-CM    1. Ureteral stone  N20.1    2. Nephrolithiasis  N20.0        Discharge Medications:  Discharge Medication List as of 12/21/2021  5:49 AM      START taking these medications    Details   ondansetron (ZOFRAN ODT) 4 MG ODT tab Take 1 tablet (4 mg) by mouth every 8 hours as needed, Disp-10 tablet, R-0, Local Print      oxyCODONE (ROXICODONE) 5 MG tablet Take 1 tablet (5 mg) by mouth every 6 hours as needed for pain No driving or operating heavy machinery while taking this medication.  You may take a stool softener  with this medication as it may cause constipation., Disp-6 tablet, R-0, Local Print      tamsulosin (FLOMAX) 0.4 MG capsule Take 1 capsule (0.4 mg) by mouth daily for 10 doses, Disp-10 capsule, R-0, Local Print             Scribe Disclosure:  ROZINA, Marciano Moreno, am serving as a scribe at 11:19 PM on 12/20/2021 to document services personally performed by Yao Vazquez MD based on my observations and the provider's statements to me.           Yao Vazquez MD  12/21/21 0622

## 2022-01-01 ENCOUNTER — HOSPITAL ENCOUNTER (OUTPATIENT)
Facility: CLINIC | Age: 22
Setting detail: OBSERVATION
Discharge: HOME | End: 2022-01-02
Attending: EMERGENCY MEDICINE | Admitting: INTERNAL MEDICINE
Payer: COMMERCIAL

## 2022-01-01 ENCOUNTER — APPOINTMENT (OUTPATIENT)
Dept: CT IMAGING | Facility: CLINIC | Age: 22
End: 2022-01-01
Attending: EMERGENCY MEDICINE
Payer: COMMERCIAL

## 2022-01-01 DIAGNOSIS — N20.0 KIDNEY STONE: ICD-10-CM

## 2022-01-01 LAB
ALBUMIN UR-MCNC: NEGATIVE MG/DL
ANION GAP SERPL CALCULATED.3IONS-SCNC: 3 MMOL/L (ref 3–14)
APPEARANCE UR: ABNORMAL
BILIRUB UR QL STRIP: NEGATIVE
BUN SERPL-MCNC: 11 MG/DL (ref 7–30)
CALCIUM SERPL-MCNC: 9.1 MG/DL (ref 8.5–10.1)
CHLORIDE BLD-SCNC: 103 MMOL/L (ref 94–109)
CO2 SERPL-SCNC: 32 MMOL/L (ref 20–32)
COLOR UR AUTO: YELLOW
CREAT SERPL-MCNC: 0.9 MG/DL (ref 0.66–1.25)
ERYTHROCYTE [DISTWIDTH] IN BLOOD BY AUTOMATED COUNT: 11.7 % (ref 10–15)
GFR SERPL CREATININE-BSD FRML MDRD: >90 ML/MIN/1.73M2
GLUCOSE BLD-MCNC: 104 MG/DL (ref 70–99)
GLUCOSE UR STRIP-MCNC: NEGATIVE MG/DL
HCT VFR BLD AUTO: 45.2 % (ref 40–53)
HGB BLD-MCNC: 14.8 G/DL (ref 13.3–17.7)
HGB UR QL STRIP: NEGATIVE
KETONES UR STRIP-MCNC: NEGATIVE MG/DL
LEUKOCYTE ESTERASE UR QL STRIP: NEGATIVE
MCH RBC QN AUTO: 29.3 PG (ref 26.5–33)
MCHC RBC AUTO-ENTMCNC: 32.7 G/DL (ref 31.5–36.5)
MCV RBC AUTO: 90 FL (ref 78–100)
MUCOUS THREADS #/AREA URNS LPF: PRESENT /LPF
NITRATE UR QL: NEGATIVE
PH UR STRIP: 7.5 [PH] (ref 5–7)
PLATELET # BLD AUTO: 254 10E3/UL (ref 150–450)
POTASSIUM BLD-SCNC: 3.8 MMOL/L (ref 3.4–5.3)
RBC # BLD AUTO: 5.05 10E6/UL (ref 4.4–5.9)
RBC URINE: 7 /HPF
SODIUM SERPL-SCNC: 138 MMOL/L (ref 133–144)
SP GR UR STRIP: 1.02 (ref 1–1.03)
UROBILINOGEN UR STRIP-MCNC: NORMAL MG/DL
WBC # BLD AUTO: 4.1 10E3/UL (ref 4–11)
WBC URINE: <1 /HPF

## 2022-01-01 PROCEDURE — 99285 EMERGENCY DEPT VISIT HI MDM: CPT | Mod: 25

## 2022-01-01 PROCEDURE — 258N000003 HC RX IP 258 OP 636: Performed by: PHYSICIAN ASSISTANT

## 2022-01-01 PROCEDURE — C9803 HOPD COVID-19 SPEC COLLECT: HCPCS

## 2022-01-01 PROCEDURE — 96375 TX/PRO/DX INJ NEW DRUG ADDON: CPT

## 2022-01-01 PROCEDURE — 250N000013 HC RX MED GY IP 250 OP 250 PS 637: Performed by: PHYSICIAN ASSISTANT

## 2022-01-01 PROCEDURE — 85041 AUTOMATED RBC COUNT: CPT | Performed by: NURSE PRACTITIONER

## 2022-01-01 PROCEDURE — 250N000011 HC RX IP 250 OP 636: Performed by: EMERGENCY MEDICINE

## 2022-01-01 PROCEDURE — 87635 SARS-COV-2 COVID-19 AMP PRB: CPT | Performed by: EMERGENCY MEDICINE

## 2022-01-01 PROCEDURE — 36415 COLL VENOUS BLD VENIPUNCTURE: CPT | Performed by: NURSE PRACTITIONER

## 2022-01-01 PROCEDURE — 81001 URINALYSIS AUTO W/SCOPE: CPT | Performed by: EMERGENCY MEDICINE

## 2022-01-01 PROCEDURE — 258N000003 HC RX IP 258 OP 636: Performed by: EMERGENCY MEDICINE

## 2022-01-01 PROCEDURE — 96361 HYDRATE IV INFUSION ADD-ON: CPT

## 2022-01-01 PROCEDURE — 82310 ASSAY OF CALCIUM: CPT | Performed by: NURSE PRACTITIONER

## 2022-01-01 PROCEDURE — 96376 TX/PRO/DX INJ SAME DRUG ADON: CPT

## 2022-01-01 PROCEDURE — G0378 HOSPITAL OBSERVATION PER HR: HCPCS

## 2022-01-01 PROCEDURE — 96374 THER/PROPH/DIAG INJ IV PUSH: CPT

## 2022-01-01 PROCEDURE — 74176 CT ABD & PELVIS W/O CONTRAST: CPT

## 2022-01-01 PROCEDURE — 99219 PR INITIAL OBSERVATION CARE,LEVEL II: CPT | Performed by: PHYSICIAN ASSISTANT

## 2022-01-01 RX ORDER — ACETAMINOPHEN 325 MG/1
650 TABLET ORAL EVERY 6 HOURS PRN
Status: DISCONTINUED | OUTPATIENT
Start: 2022-01-01 | End: 2022-01-03 | Stop reason: HOSPADM

## 2022-01-01 RX ORDER — MORPHINE SULFATE 4 MG/ML
4 INJECTION, SOLUTION INTRAMUSCULAR; INTRAVENOUS
Status: DISCONTINUED | OUTPATIENT
Start: 2022-01-01 | End: 2022-01-01

## 2022-01-01 RX ORDER — LIDOCAINE 40 MG/G
CREAM TOPICAL
Status: DISCONTINUED | OUTPATIENT
Start: 2022-01-01 | End: 2022-01-03 | Stop reason: HOSPADM

## 2022-01-01 RX ORDER — OXYCODONE HYDROCHLORIDE 5 MG/1
5 TABLET ORAL EVERY 4 HOURS PRN
Status: DISCONTINUED | OUTPATIENT
Start: 2022-01-01 | End: 2022-01-03 | Stop reason: HOSPADM

## 2022-01-01 RX ORDER — POLYETHYLENE GLYCOL 3350 17 G/17G
17 POWDER, FOR SOLUTION ORAL DAILY PRN
Status: DISCONTINUED | OUTPATIENT
Start: 2022-01-01 | End: 2022-01-03 | Stop reason: HOSPADM

## 2022-01-01 RX ORDER — HYDROMORPHONE HYDROCHLORIDE 1 MG/ML
.3-.5 INJECTION, SOLUTION INTRAMUSCULAR; INTRAVENOUS; SUBCUTANEOUS
Status: DISCONTINUED | OUTPATIENT
Start: 2022-01-01 | End: 2022-01-03 | Stop reason: HOSPADM

## 2022-01-01 RX ORDER — TAMSULOSIN HYDROCHLORIDE 0.4 MG/1
0.4 CAPSULE ORAL DAILY
Status: DISCONTINUED | OUTPATIENT
Start: 2022-01-02 | End: 2022-01-03 | Stop reason: HOSPADM

## 2022-01-01 RX ORDER — ONDANSETRON 2 MG/ML
4 INJECTION INTRAMUSCULAR; INTRAVENOUS EVERY 30 MIN PRN
Status: DISCONTINUED | OUTPATIENT
Start: 2022-01-01 | End: 2022-01-01

## 2022-01-01 RX ORDER — HYDROMORPHONE HYDROCHLORIDE 1 MG/ML
0.5 INJECTION, SOLUTION INTRAMUSCULAR; INTRAVENOUS; SUBCUTANEOUS
Status: DISCONTINUED | OUTPATIENT
Start: 2022-01-01 | End: 2022-01-01

## 2022-01-01 RX ORDER — KETOROLAC TROMETHAMINE 15 MG/ML
15 INJECTION, SOLUTION INTRAMUSCULAR; INTRAVENOUS ONCE
Status: COMPLETED | OUTPATIENT
Start: 2022-01-01 | End: 2022-01-01

## 2022-01-01 RX ORDER — DIPHENHYDRAMINE HCL 25 MG
25 CAPSULE ORAL EVERY 6 HOURS PRN
Status: DISCONTINUED | OUTPATIENT
Start: 2022-01-01 | End: 2022-01-03 | Stop reason: HOSPADM

## 2022-01-01 RX ORDER — ONDANSETRON 2 MG/ML
4 INJECTION INTRAMUSCULAR; INTRAVENOUS EVERY 6 HOURS PRN
Status: DISCONTINUED | OUTPATIENT
Start: 2022-01-01 | End: 2022-01-03 | Stop reason: HOSPADM

## 2022-01-01 RX ORDER — AMOXICILLIN 250 MG
1 CAPSULE ORAL 2 TIMES DAILY PRN
Status: DISCONTINUED | OUTPATIENT
Start: 2022-01-01 | End: 2022-01-03 | Stop reason: HOSPADM

## 2022-01-01 RX ORDER — ONDANSETRON 4 MG/1
4 TABLET, ORALLY DISINTEGRATING ORAL EVERY 6 HOURS PRN
Status: DISCONTINUED | OUTPATIENT
Start: 2022-01-01 | End: 2022-01-03 | Stop reason: HOSPADM

## 2022-01-01 RX ORDER — SODIUM CHLORIDE 9 MG/ML
INJECTION, SOLUTION INTRAVENOUS CONTINUOUS
Status: DISCONTINUED | OUTPATIENT
Start: 2022-01-01 | End: 2022-01-03 | Stop reason: HOSPADM

## 2022-01-01 RX ORDER — AMOXICILLIN 250 MG
2 CAPSULE ORAL 2 TIMES DAILY PRN
Status: DISCONTINUED | OUTPATIENT
Start: 2022-01-01 | End: 2022-01-03 | Stop reason: HOSPADM

## 2022-01-01 RX ADMIN — SODIUM CHLORIDE 1000 ML: 9 INJECTION, SOLUTION INTRAVENOUS at 19:22

## 2022-01-01 RX ADMIN — KETOROLAC TROMETHAMINE 15 MG: 15 INJECTION, SOLUTION INTRAMUSCULAR; INTRAVENOUS at 19:23

## 2022-01-01 RX ADMIN — ONDANSETRON 4 MG: 2 INJECTION INTRAMUSCULAR; INTRAVENOUS at 19:23

## 2022-01-01 RX ADMIN — HYDROMORPHONE HYDROCHLORIDE 0.5 MG: 1 INJECTION, SOLUTION INTRAMUSCULAR; INTRAVENOUS; SUBCUTANEOUS at 20:06

## 2022-01-01 RX ADMIN — DIPHENHYDRAMINE HYDROCHLORIDE 25 MG: 25 CAPSULE ORAL at 22:47

## 2022-01-01 RX ADMIN — SODIUM CHLORIDE: 900 INJECTION INTRAVENOUS at 22:44

## 2022-01-01 RX ADMIN — HYDROMORPHONE HYDROCHLORIDE 0.5 MG: 1 INJECTION, SOLUTION INTRAMUSCULAR; INTRAVENOUS; SUBCUTANEOUS at 20:46

## 2022-01-01 RX ADMIN — MORPHINE SULFATE 4 MG: 4 INJECTION INTRAVENOUS at 21:46

## 2022-01-01 RX ADMIN — MORPHINE SULFATE 4 MG: 4 INJECTION INTRAVENOUS at 21:25

## 2022-01-01 ASSESSMENT — ENCOUNTER SYMPTOMS
DIFFICULTY URINATING: 1
COUGH: 0
ABDOMINAL PAIN: 1
BACK PAIN: 1
FEVER: 0
DYSURIA: 1
CHILLS: 0
VOMITING: 1
FLANK PAIN: 1

## 2022-01-02 ENCOUNTER — APPOINTMENT (OUTPATIENT)
Dept: GENERAL RADIOLOGY | Facility: CLINIC | Age: 22
End: 2022-01-02
Attending: UROLOGY
Payer: COMMERCIAL

## 2022-01-02 ENCOUNTER — ANESTHESIA (OUTPATIENT)
Dept: SURGERY | Facility: CLINIC | Age: 22
End: 2022-01-02
Payer: COMMERCIAL

## 2022-01-02 ENCOUNTER — ANESTHESIA EVENT (OUTPATIENT)
Dept: SURGERY | Facility: CLINIC | Age: 22
End: 2022-01-02
Payer: COMMERCIAL

## 2022-01-02 LAB
ANION GAP SERPL CALCULATED.3IONS-SCNC: 3 MMOL/L (ref 3–14)
BASOPHILS # BLD AUTO: 0 10E3/UL (ref 0–0.2)
BASOPHILS NFR BLD AUTO: 1 %
BUN SERPL-MCNC: 9 MG/DL (ref 7–30)
CALCIUM SERPL-MCNC: 8.4 MG/DL (ref 8.5–10.1)
CHLORIDE BLD-SCNC: 108 MMOL/L (ref 94–109)
CO2 SERPL-SCNC: 29 MMOL/L (ref 20–32)
CREAT SERPL-MCNC: 0.91 MG/DL (ref 0.66–1.25)
EOSINOPHIL # BLD AUTO: 0.2 10E3/UL (ref 0–0.7)
EOSINOPHIL NFR BLD AUTO: 4 %
ERYTHROCYTE [DISTWIDTH] IN BLOOD BY AUTOMATED COUNT: 11.6 % (ref 10–15)
GFR SERPL CREATININE-BSD FRML MDRD: >90 ML/MIN/1.73M2
GLUCOSE BLD-MCNC: 93 MG/DL (ref 70–99)
HCT VFR BLD AUTO: 41.4 % (ref 40–53)
HGB BLD-MCNC: 13 G/DL (ref 13.3–17.7)
IMM GRANULOCYTES # BLD: 0 10E3/UL
IMM GRANULOCYTES NFR BLD: 0 %
LYMPHOCYTES # BLD AUTO: 1.5 10E3/UL (ref 0.8–5.3)
LYMPHOCYTES NFR BLD AUTO: 35 %
MCH RBC QN AUTO: 29 PG (ref 26.5–33)
MCHC RBC AUTO-ENTMCNC: 31.4 G/DL (ref 31.5–36.5)
MCV RBC AUTO: 92 FL (ref 78–100)
MONOCYTES # BLD AUTO: 0.6 10E3/UL (ref 0–1.3)
MONOCYTES NFR BLD AUTO: 15 %
NEUTROPHILS # BLD AUTO: 1.9 10E3/UL (ref 1.6–8.3)
NEUTROPHILS NFR BLD AUTO: 45 %
NRBC # BLD AUTO: 0 10E3/UL
NRBC BLD AUTO-RTO: 0 /100
PLATELET # BLD AUTO: 217 10E3/UL (ref 150–450)
POTASSIUM BLD-SCNC: 4.1 MMOL/L (ref 3.4–5.3)
RBC # BLD AUTO: 4.48 10E6/UL (ref 4.4–5.9)
SARS-COV-2 RNA RESP QL NAA+PROBE: NEGATIVE
SODIUM SERPL-SCNC: 140 MMOL/L (ref 133–144)
WBC # BLD AUTO: 4.2 10E3/UL (ref 4–11)

## 2022-01-02 PROCEDURE — C1769 GUIDE WIRE: HCPCS | Performed by: UROLOGY

## 2022-01-02 PROCEDURE — 258N000003 HC RX IP 258 OP 636: Performed by: PHYSICIAN ASSISTANT

## 2022-01-02 PROCEDURE — 80048 BASIC METABOLIC PNL TOTAL CA: CPT | Performed by: PHYSICIAN ASSISTANT

## 2022-01-02 PROCEDURE — 272N000001 HC OR GENERAL SUPPLY STERILE: Performed by: UROLOGY

## 2022-01-02 PROCEDURE — G0378 HOSPITAL OBSERVATION PER HR: HCPCS

## 2022-01-02 PROCEDURE — 99203 OFFICE O/P NEW LOW 30 MIN: CPT | Mod: 25 | Performed by: UROLOGY

## 2022-01-02 PROCEDURE — 85025 COMPLETE CBC W/AUTO DIFF WBC: CPT | Performed by: PHYSICIAN ASSISTANT

## 2022-01-02 PROCEDURE — 250N000011 HC RX IP 250 OP 636: Performed by: NURSE ANESTHETIST, CERTIFIED REGISTERED

## 2022-01-02 PROCEDURE — 258N000003 HC RX IP 258 OP 636: Performed by: ANESTHESIOLOGY

## 2022-01-02 PROCEDURE — 250N000013 HC RX MED GY IP 250 OP 250 PS 637: Performed by: ANESTHESIOLOGY

## 2022-01-02 PROCEDURE — 36415 COLL VENOUS BLD VENIPUNCTURE: CPT | Performed by: PHYSICIAN ASSISTANT

## 2022-01-02 PROCEDURE — 99217 PR OBSERVATION CARE DISCHARGE: CPT | Performed by: INTERNAL MEDICINE

## 2022-01-02 PROCEDURE — 250N000011 HC RX IP 250 OP 636: Performed by: ANESTHESIOLOGY

## 2022-01-02 PROCEDURE — 360N000083 HC SURGERY LEVEL 3 W/ FLUORO, PER MIN: Performed by: UROLOGY

## 2022-01-02 PROCEDURE — 710N000012 HC RECOVERY PHASE 2, PER MINUTE: Performed by: UROLOGY

## 2022-01-02 PROCEDURE — 250N000013 HC RX MED GY IP 250 OP 250 PS 637: Performed by: PHYSICIAN ASSISTANT

## 2022-01-02 PROCEDURE — 999N000141 HC STATISTIC PRE-PROCEDURE NURSING ASSESSMENT: Performed by: UROLOGY

## 2022-01-02 PROCEDURE — 258N000001 HC RX 258: Performed by: UROLOGY

## 2022-01-02 PROCEDURE — 250N000009 HC RX 250: Performed by: NURSE ANESTHETIST, CERTIFIED REGISTERED

## 2022-01-02 PROCEDURE — 710N000010 HC RECOVERY PHASE 1, LEVEL 2, PER MIN: Performed by: UROLOGY

## 2022-01-02 PROCEDURE — 250N000009 HC RX 250: Performed by: ANESTHESIOLOGY

## 2022-01-02 PROCEDURE — 999N000179 XR SURGERY CARM FLUORO LESS THAN 5 MIN W STILLS: Mod: TC

## 2022-01-02 PROCEDURE — 82365 CALCULUS SPECTROSCOPY: CPT | Performed by: UROLOGY

## 2022-01-02 PROCEDURE — 370N000017 HC ANESTHESIA TECHNICAL FEE, PER MIN: Performed by: UROLOGY

## 2022-01-02 PROCEDURE — 258N000003 HC RX IP 258 OP 636: Performed by: NURSE ANESTHETIST, CERTIFIED REGISTERED

## 2022-01-02 PROCEDURE — 52351 CYSTOURETERO & OR PYELOSCOPE: CPT | Mod: GC | Performed by: UROLOGY

## 2022-01-02 RX ORDER — SODIUM CHLORIDE, SODIUM LACTATE, POTASSIUM CHLORIDE, CALCIUM CHLORIDE 600; 310; 30; 20 MG/100ML; MG/100ML; MG/100ML; MG/100ML
INJECTION, SOLUTION INTRAVENOUS CONTINUOUS PRN
Status: DISCONTINUED | OUTPATIENT
Start: 2022-01-02 | End: 2022-01-02

## 2022-01-02 RX ORDER — HYDROMORPHONE HCL IN WATER/PF 6 MG/30 ML
0.2 PATIENT CONTROLLED ANALGESIA SYRINGE INTRAVENOUS EVERY 5 MIN PRN
Status: DISCONTINUED | OUTPATIENT
Start: 2022-01-02 | End: 2022-01-02 | Stop reason: HOSPADM

## 2022-01-02 RX ORDER — DICLOFENAC POTASSIUM 50 MG/1
50 TABLET, FILM COATED ORAL 3 TIMES DAILY PRN
Qty: 10 TABLET | Refills: 0 | Status: SHIPPED | OUTPATIENT
Start: 2022-01-02 | End: 2022-01-02

## 2022-01-02 RX ORDER — TAMSULOSIN HYDROCHLORIDE 0.4 MG/1
0.4 CAPSULE ORAL DAILY
Qty: 3 CAPSULE | Refills: 0 | Status: SHIPPED | OUTPATIENT
Start: 2022-01-02 | End: 2022-01-02

## 2022-01-02 RX ORDER — EPHEDRINE SULFATE 50 MG/ML
25 INJECTION, SOLUTION INTRAVENOUS ONCE
Status: COMPLETED | OUTPATIENT
Start: 2022-01-02 | End: 2022-01-02

## 2022-01-02 RX ORDER — DICLOFENAC POTASSIUM 50 MG/1
50 TABLET, FILM COATED ORAL 3 TIMES DAILY PRN
Qty: 10 TABLET | Refills: 0 | Status: ON HOLD | OUTPATIENT
Start: 2022-01-02 | End: 2022-02-15

## 2022-01-02 RX ORDER — PROMETHAZINE HYDROCHLORIDE 25 MG/ML
25 INJECTION INTRAMUSCULAR; INTRAVENOUS ONCE
Status: COMPLETED | OUTPATIENT
Start: 2022-01-02 | End: 2022-01-02

## 2022-01-02 RX ORDER — ONDANSETRON 4 MG/1
4 TABLET, ORALLY DISINTEGRATING ORAL EVERY 30 MIN PRN
Status: DISCONTINUED | OUTPATIENT
Start: 2022-01-02 | End: 2022-01-03 | Stop reason: HOSPADM

## 2022-01-02 RX ORDER — OXYCODONE HYDROCHLORIDE 5 MG/1
5 TABLET ORAL EVERY 4 HOURS PRN
Status: DISCONTINUED | OUTPATIENT
Start: 2022-01-02 | End: 2022-01-03 | Stop reason: HOSPADM

## 2022-01-02 RX ORDER — GLYCOPYRROLATE 0.2 MG/ML
INJECTION, SOLUTION INTRAMUSCULAR; INTRAVENOUS PRN
Status: DISCONTINUED | OUTPATIENT
Start: 2022-01-02 | End: 2022-01-02

## 2022-01-02 RX ORDER — DEXAMETHASONE SODIUM PHOSPHATE 4 MG/ML
INJECTION, SOLUTION INTRA-ARTICULAR; INTRALESIONAL; INTRAMUSCULAR; INTRAVENOUS; SOFT TISSUE PRN
Status: DISCONTINUED | OUTPATIENT
Start: 2022-01-02 | End: 2022-01-02

## 2022-01-02 RX ORDER — NALOXONE HYDROCHLORIDE 0.4 MG/ML
0.2 INJECTION, SOLUTION INTRAMUSCULAR; INTRAVENOUS; SUBCUTANEOUS
Status: DISCONTINUED | OUTPATIENT
Start: 2022-01-02 | End: 2022-01-03 | Stop reason: HOSPADM

## 2022-01-02 RX ORDER — LIDOCAINE 40 MG/G
CREAM TOPICAL
Status: DISCONTINUED | OUTPATIENT
Start: 2022-01-02 | End: 2022-01-02 | Stop reason: HOSPADM

## 2022-01-02 RX ORDER — ONDANSETRON 2 MG/ML
INJECTION INTRAMUSCULAR; INTRAVENOUS PRN
Status: DISCONTINUED | OUTPATIENT
Start: 2022-01-02 | End: 2022-01-02

## 2022-01-02 RX ORDER — ACETAMINOPHEN 325 MG/1
975 TABLET ORAL ONCE
Status: COMPLETED | OUTPATIENT
Start: 2022-01-02 | End: 2022-01-02

## 2022-01-02 RX ORDER — KETOROLAC TROMETHAMINE 30 MG/ML
30 INJECTION, SOLUTION INTRAMUSCULAR; INTRAVENOUS ONCE
Status: COMPLETED | OUTPATIENT
Start: 2022-01-02 | End: 2022-01-02

## 2022-01-02 RX ORDER — AMMONIA INHALANTS 0.04 G/.3ML
INHALANT RESPIRATORY (INHALATION)
Status: DISCONTINUED
Start: 2022-01-02 | End: 2022-01-02 | Stop reason: HOSPADM

## 2022-01-02 RX ORDER — FENTANYL CITRATE 50 UG/ML
25 INJECTION, SOLUTION INTRAMUSCULAR; INTRAVENOUS
Status: DISCONTINUED | OUTPATIENT
Start: 2022-01-02 | End: 2022-01-03 | Stop reason: HOSPADM

## 2022-01-02 RX ORDER — FENTANYL CITRATE 50 UG/ML
INJECTION, SOLUTION INTRAMUSCULAR; INTRAVENOUS PRN
Status: DISCONTINUED | OUTPATIENT
Start: 2022-01-02 | End: 2022-01-02

## 2022-01-02 RX ORDER — PROPOFOL 10 MG/ML
INJECTION, EMULSION INTRAVENOUS PRN
Status: DISCONTINUED | OUTPATIENT
Start: 2022-01-02 | End: 2022-01-02

## 2022-01-02 RX ORDER — CEFAZOLIN SODIUM 2 G/100ML
INJECTION, SOLUTION INTRAVENOUS PRN
Status: DISCONTINUED | OUTPATIENT
Start: 2022-01-02 | End: 2022-01-02

## 2022-01-02 RX ORDER — NALOXONE HYDROCHLORIDE 0.4 MG/ML
0.4 INJECTION, SOLUTION INTRAMUSCULAR; INTRAVENOUS; SUBCUTANEOUS
Status: DISCONTINUED | OUTPATIENT
Start: 2022-01-02 | End: 2022-01-03 | Stop reason: HOSPADM

## 2022-01-02 RX ORDER — SODIUM CHLORIDE, SODIUM LACTATE, POTASSIUM CHLORIDE, CALCIUM CHLORIDE 600; 310; 30; 20 MG/100ML; MG/100ML; MG/100ML; MG/100ML
INJECTION, SOLUTION INTRAVENOUS CONTINUOUS
Status: DISCONTINUED | OUTPATIENT
Start: 2022-01-02 | End: 2022-01-02 | Stop reason: HOSPADM

## 2022-01-02 RX ORDER — LIDOCAINE HYDROCHLORIDE 10 MG/ML
INJECTION, SOLUTION INFILTRATION; PERINEURAL PRN
Status: DISCONTINUED | OUTPATIENT
Start: 2022-01-02 | End: 2022-01-02

## 2022-01-02 RX ORDER — ONDANSETRON 2 MG/ML
4 INJECTION INTRAMUSCULAR; INTRAVENOUS EVERY 30 MIN PRN
Status: DISCONTINUED | OUTPATIENT
Start: 2022-01-02 | End: 2022-01-03 | Stop reason: HOSPADM

## 2022-01-02 RX ORDER — SODIUM CHLORIDE, SODIUM LACTATE, POTASSIUM CHLORIDE, CALCIUM CHLORIDE 600; 310; 30; 20 MG/100ML; MG/100ML; MG/100ML; MG/100ML
INJECTION, SOLUTION INTRAVENOUS CONTINUOUS
Status: DISCONTINUED | OUTPATIENT
Start: 2022-01-02 | End: 2022-01-03 | Stop reason: HOSPADM

## 2022-01-02 RX ORDER — PHENYLEPHRINE HYDROCHLORIDE 10 MG/ML
INJECTION INTRAVENOUS PRN
Status: DISCONTINUED | OUTPATIENT
Start: 2022-01-02 | End: 2022-01-02

## 2022-01-02 RX ORDER — FENTANYL CITRATE 50 UG/ML
25 INJECTION, SOLUTION INTRAMUSCULAR; INTRAVENOUS EVERY 5 MIN PRN
Status: DISCONTINUED | OUTPATIENT
Start: 2022-01-02 | End: 2022-01-02 | Stop reason: HOSPADM

## 2022-01-02 RX ORDER — TAMSULOSIN HYDROCHLORIDE 0.4 MG/1
0.4 CAPSULE ORAL DAILY
Qty: 3 CAPSULE | Refills: 0 | Status: SHIPPED | OUTPATIENT
Start: 2022-01-02 | End: 2022-01-05

## 2022-01-02 RX ORDER — MEPERIDINE HYDROCHLORIDE 25 MG/ML
12.5 INJECTION INTRAMUSCULAR; INTRAVENOUS; SUBCUTANEOUS
Status: DISCONTINUED | OUTPATIENT
Start: 2022-01-02 | End: 2022-01-03 | Stop reason: HOSPADM

## 2022-01-02 RX ADMIN — SODIUM CHLORIDE, POTASSIUM CHLORIDE, SODIUM LACTATE AND CALCIUM CHLORIDE: 600; 310; 30; 20 INJECTION, SOLUTION INTRAVENOUS at 19:37

## 2022-01-02 RX ADMIN — SODIUM CHLORIDE, POTASSIUM CHLORIDE, SODIUM LACTATE AND CALCIUM CHLORIDE: 600; 310; 30; 20 INJECTION, SOLUTION INTRAVENOUS at 18:05

## 2022-01-02 RX ADMIN — EPHEDRINE SULFATE 25 MG: 50 INJECTION INTRAVENOUS at 21:35

## 2022-01-02 RX ADMIN — ACETAMINOPHEN 975 MG: 325 TABLET, FILM COATED ORAL at 20:43

## 2022-01-02 RX ADMIN — CEFAZOLIN SODIUM 2 G: 2 INJECTION, SOLUTION INTRAVENOUS at 18:15

## 2022-01-02 RX ADMIN — DEXAMETHASONE SODIUM PHOSPHATE 4 MG: 4 INJECTION, SOLUTION INTRA-ARTICULAR; INTRALESIONAL; INTRAMUSCULAR; INTRAVENOUS; SOFT TISSUE at 18:10

## 2022-01-02 RX ADMIN — OXYCODONE HYDROCHLORIDE 5 MG: 5 TABLET ORAL at 19:57

## 2022-01-02 RX ADMIN — ONDANSETRON HYDROCHLORIDE 4 MG: 2 INJECTION, SOLUTION INTRAVENOUS at 18:23

## 2022-01-02 RX ADMIN — FENTANYL CITRATE 25 MCG: 50 INJECTION, SOLUTION INTRAMUSCULAR; INTRAVENOUS at 19:38

## 2022-01-02 RX ADMIN — PROPOFOL 200 MG: 10 INJECTION, EMULSION INTRAVENOUS at 18:10

## 2022-01-02 RX ADMIN — GLYCOPYRROLATE 0.2 MG: 0.2 INJECTION, SOLUTION INTRAMUSCULAR; INTRAVENOUS at 18:17

## 2022-01-02 RX ADMIN — KETOROLAC TROMETHAMINE 30 MG: 30 INJECTION, SOLUTION INTRAMUSCULAR at 20:46

## 2022-01-02 RX ADMIN — SODIUM CHLORIDE: 900 INJECTION INTRAVENOUS at 05:23

## 2022-01-02 RX ADMIN — LIDOCAINE HYDROCHLORIDE 50 MG: 10 INJECTION, SOLUTION INFILTRATION; PERINEURAL at 18:10

## 2022-01-02 RX ADMIN — FENTANYL CITRATE 25 MCG: 50 INJECTION, SOLUTION INTRAMUSCULAR; INTRAVENOUS at 19:56

## 2022-01-02 RX ADMIN — FENTANYL CITRATE 100 MCG: 50 INJECTION, SOLUTION INTRAMUSCULAR; INTRAVENOUS at 18:10

## 2022-01-02 RX ADMIN — MIDAZOLAM 2 MG: 1 INJECTION INTRAMUSCULAR; INTRAVENOUS at 18:04

## 2022-01-02 RX ADMIN — TAMSULOSIN HYDROCHLORIDE 0.4 MG: 0.4 CAPSULE ORAL at 08:01

## 2022-01-02 RX ADMIN — PROMETHAZINE HYDROCHLORIDE 25 MG: 25 INJECTION INTRAMUSCULAR; INTRAVENOUS at 21:35

## 2022-01-02 RX ADMIN — PHENYLEPHRINE HYDROCHLORIDE 150 MCG: 10 INJECTION INTRAVENOUS at 18:21

## 2022-01-02 RX ADMIN — ONDANSETRON 4 MG: 2 INJECTION INTRAMUSCULAR; INTRAVENOUS at 20:58

## 2022-01-02 NOTE — ED NOTES
[facility]  ED Nurse Handoff Report    Brendan Hunt is a 22 year old male   ED Chief complaint: Flank Pain  . ED Diagnosis:   Final diagnoses:   Kidney stone     Allergies: No Known Allergies    Code Status: Full Code  Activity level - Baseline/Home:  Independent. Activity Level - Current:   Stand by Assist. Lift room needed: No. Bariatric: No   Needed: No   Isolation: No. Infection: Not Applicable.     Vital Signs:   Vitals:    01/01/22 2030 01/01/22 2045 01/01/22 2100 01/01/22 2115   BP: 125/82 122/68 127/76    Pulse: 80 71 75    Resp:       Temp:       TempSrc:       SpO2: 100% 100% 100% 100%       Cardiac Rhythm:  ,      Pain level:    Patient confused: No. Patient Falls Risk: Yes.   Elimination Status: Has voided   Patient Report - Initial Complaint: Pt here with L flank pain and dysuria since this afternoon. Seen last week with kidney stones. No n/v. ABC intact. . Focused Assessment: resting   Tests Performed:   CT Abdomen Pelvis w/o Contrast   Final Result   IMPRESSION:    1.  Right ureterovesicular junction 3 mm obstructing calculus with severe right hydroureteronephrosis.   2.  Additional small nonobstructing right renal calculi.           . Abnormal Results:   Labs Ordered and Resulted from Time of ED Arrival to Time of ED Departure   BASIC METABOLIC PANEL - Abnormal       Result Value    Sodium 138      Potassium 3.8      Chloride 103      Carbon Dioxide (CO2) 32      Anion Gap 3      Urea Nitrogen 11      Creatinine 0.90      Calcium 9.1      Glucose 104 (*)     GFR Estimate >90     ROUTINE UA WITH MICROSCOPIC REFLEX TO CULTURE - Abnormal    Color Urine Yellow      Appearance Urine Cloudy (*)     Glucose Urine Negative      Bilirubin Urine Negative      Ketones Urine Negative      Specific Gravity Urine 1.018      Blood Urine Negative      pH Urine 7.5 (*)     Protein Albumin Urine Negative      Urobilinogen Urine Normal      Nitrite Urine Negative      Leukocyte Esterase  Urine Negative      Mucus Urine Present (*)     RBC Urine 7 (*)     WBC Urine <1     CBC WITH PLATELETS - Normal    WBC Count 4.1      RBC Count 5.05      Hemoglobin 14.8      Hematocrit 45.2      MCV 90      MCH 29.3      MCHC 32.7      RDW 11.7      Platelet Count 254     COVID-19 VIRUS (CORONAVIRUS) BY PCR     .   Treatments provided: see mar  Family Comments: mom is present  OBS brochure/video discussed/provided to patient:  Yes  ED Medications:   Medications   ondansetron (ZOFRAN) injection 4 mg (4 mg Intravenous Given 1/1/22 1923)   HYDROmorphone (PF) (DILAUDID) injection 0.5 mg (0.5 mg Intravenous Given 1/1/22 2046)   morphine (PF) injection 4 mg (4 mg Intravenous Given 1/1/22 2146)   0.9% sodium chloride BOLUS (0 mLs Intravenous Stopped 1/1/22 2115)   ketorolac (TORADOL) injection 15 mg (15 mg Intravenous Given 1/1/22 1923)     Drips infusing:  No  For the majority of the shift, the patient's behavior Green. Interventions performed were see mar.    Sepsis treatment initiated: No     Patient tested for COVID 19 prior to admission: YES    ED Nurse Name/Phone Number: Ryan Hankins RN,   9:55 PM    RECEIVING UNIT ED HANDOFF REVIEW    Above ED Nurse Handoff Report was reviewed: Yes  Reviewed by: Maria M Tapia RN on January 2, 2022 at 9:09 AM

## 2022-01-02 NOTE — ED PROVIDER NOTES
History   Chief Complaint:  Flank Pain       HPI   Brendan Hunt is a 22 year old male with history of kidney stone with surgical intervention who presents with back pain with radiation through his right flank pain to his abdomen and down his leg. He notes dysuria, difficulty urinating, and vomiting.  He reports that he was seen last week for this and diagnosed with a kidney stone. Mentions that he has been taking the pain medication that this doctor prescribed him. Denies fever, chills, chest pain, or cough.     Review of Systems   Constitutional: Negative for chills and fever.   Respiratory: Negative for cough.    Cardiovascular: Negative for chest pain.   Gastrointestinal: Positive for abdominal pain and vomiting.   Genitourinary: Positive for difficulty urinating, dysuria and flank pain.   Musculoskeletal: Positive for back pain.   All other systems reviewed and are negative.    Allergies:  The patient has no known allergies.     Medications:  The patient is not currently taking any prescribed medications.    Past Medical History:     Hydronephrosis  Pyelonephritis  Urolithiasis  Migraine with aura  Sepsis    Past Surgical History:    Cystoscopy with stent x3  Cystoscopy with ureteral stent removal    Social History:  The patient presents to the ED with his mother.     Physical Exam     Patient Vitals for the past 24 hrs:   BP Temp Temp src Pulse Resp SpO2   01/01/22 2115 -- -- -- -- -- 100 %   01/01/22 2100 127/76 -- -- 75 -- 100 %   01/01/22 2045 122/68 -- -- 71 -- 100 %   01/01/22 2030 125/82 -- -- 80 -- 100 %   01/01/22 2015 -- -- -- -- -- 100 %   01/01/22 2000 121/42 -- -- 85 -- 100 %   01/01/22 1820 120/61 98.5  F (36.9  C) Temporal 67 18 97 %       Physical Exam  General: Appears uncomfortable  Head: The scalp, face, and head appear normal  Eyes: The pupils are equal, round, and reactive to light. Conjunctivae and sclerae are normal  CV: Regular rate and rhythm.   Resp: Lungs are clear without  wheezes or rales. No respiratory distress.   GI: Abdomen is soft, no rigidity, guarding, or rebound. No distension. No tenderness to palpation in any quadrant.  No CVA tenderness   MS: Normal tone. Joints grossly normal without effusions. No asymmetric leg swelling, calf or thigh tenderness.    Skin: No rash or lesions noted. Normal capillary refill noted  Neuro: Speech is normal and fluent. Face is symmetric. Moving all extremities.   Psych:  Normal affect.  Appropriate interactions.    Emergency Department Course     Imaging:  CT Abdomen Pelvis w/o Contrast   Final Result   IMPRESSION:    1.  Right ureterovesicular junction 3 mm obstructing calculus with severe right hydroureteronephrosis.   2.  Additional small nonobstructing right renal calculi.           Report per radiology    Laboratory:  Labs Ordered and Resulted from Time of ED Arrival to Time of ED Departure   BASIC METABOLIC PANEL - Abnormal       Result Value    Sodium 138      Potassium 3.8      Chloride 103      Carbon Dioxide (CO2) 32      Anion Gap 3      Urea Nitrogen 11      Creatinine 0.90      Calcium 9.1      Glucose 104 (*)     GFR Estimate >90     ROUTINE UA WITH MICROSCOPIC REFLEX TO CULTURE - Abnormal    Color Urine Yellow      Appearance Urine Cloudy (*)     Glucose Urine Negative      Bilirubin Urine Negative      Ketones Urine Negative      Specific Gravity Urine 1.018      Blood Urine Negative      pH Urine 7.5 (*)     Protein Albumin Urine Negative      Urobilinogen Urine Normal      Nitrite Urine Negative      Leukocyte Esterase Urine Negative      Mucus Urine Present (*)     RBC Urine 7 (*)     WBC Urine <1     CBC WITH PLATELETS - Normal    WBC Count 4.1      RBC Count 5.05      Hemoglobin 14.8      Hematocrit 45.2      MCV 90      MCH 29.3      MCHC 32.7      RDW 11.7      Platelet Count 254     COVID-19 VIRUS (CORONAVIRUS) BY PCR      Emergency Department Course:    Reviewed:  I reviewed nursing notes, vitals, past medical history  and Care Everywhere    Assessments:  1910 I obtained history and examined the patient as noted above.     2031 I rechecked the patient and explained findings.     2134 I rechecked and updated the patient.     Consults:  2132 I spoke with Yareli CLAROS for Dr. Ellison, hospitalist service, who accepts the patient.     Interventions:  1922 NS 1L IV  1923 Zofran 4 mg IV  1923 Toradol 15 mg IV  2006 Dilaudid 0.5 mg IV  2046 Dilaudid 0.5 mg I  2125 Morphine 4 mg IV  2146 Morphine 4 mg IV    Disposition:  The patient was admitted to the hospital under the care of Dr. Ellison.     Impression & Plan     Medical Decision Making:  This is a 22-year-old male who presents emergency department with right flank pain that radiates into his groin and abdomen.  Also notes some dysuria and vomiting.  He reports that he was seen at this emergency department and was diagnosed with a kidney stone but I see no record of this in epic.  In any case the patient appears quite uncomfortable and in review of his chart has significant history of kidney stones.  On initial evaluation he is hemodynamically stable and afebrile.  CT scan was obtained which shows a 3 mm kidney stone at the right ureterovesicular junction with associated hydronephrosis.  UA does not show any signs of infection.  Blood work is otherwise reassuring including a normal creatinine.  Patient's pain was difficult to control in the emergency department therefore he will require admission for further symptomatic relief.  May require stone retrieval tomorrow if pain does not subside.  Discussed plan with the patient and his mother and they are agreeable at this time.  Questions were answered patient be admitted in stable condition.      Diagnosis:    ICD-10-CM    1. Kidney stone  N20.0        Scribe Disclosure:  ROZINA, Marciano Moreno, am serving as a scribe at 7:06 PM on 1/1/2022 to document services personally performed by Uday Samayoa MD based on my observations and the  provider's statements to me.             Uday Samayoa MD  01/01/22 3735

## 2022-01-02 NOTE — ANESTHESIA PREPROCEDURE EVALUATION
Anesthesia Pre-Procedure Evaluation    Patient: Brendan Hunt   MRN: [id number] : 2000        Preoperative Diagnosis: Kidney stone [N20.0]    Procedure : Procedure(s):  CYSTOURETEROSCOPY, WITH RETROGRADE PYELOGRAM, HOLMIUM LASER LITHOTRIPSY OF URETERAL CALCULUS, AND STENT INSERTION          Past Medical History:   Diagnosis Date     Headache 2019     Kidney stones      Migraine with aura and without status migrainosus, not intractable 2019     Nephrolithiasis 2019      History reviewed. No pertinent surgical history.   No Known Allergies   Social History     Tobacco Use     Smoking status: Current Every Day Smoker     Smokeless tobacco: Never Used   Substance Use Topics     Alcohol use: Not on file      Wt Readings from Last 1 Encounters:   22 61.7 kg (136 lb)        Anesthesia Evaluation            ROS/MED HX  ENT/Pulmonary:  - neg pulmonary ROS     Neurologic:     (+) migraines,     Cardiovascular:  - neg cardiovascular ROS     METS/Exercise Tolerance:     Hematologic:  - neg hematologic  ROS     Musculoskeletal:  - neg musculoskeletal ROS     GI/Hepatic:  - neg GI/hepatic ROS     Renal/Genitourinary:     (+) Nephrolithiasis ,     Endo:  - neg endo ROS     Psychiatric/Substance Use:  - neg psychiatric ROS     Infectious Disease:  - neg infectious disease ROS     Malignancy:  - neg malignancy ROS     Other:  - neg other ROS          Physical Exam    Airway        Mallampati: II   TM distance: > 3 FB   Neck ROM: full   Mouth opening: > 3 cm    Respiratory Devices and Support         Dental           Cardiovascular   cardiovascular exam normal          Pulmonary   pulmonary exam normal            Other findings: Lab Test        22                       0729          1922          2130          WBC          4.2          4.1          7.1           HGB          13.0*        14.8         14.7          MCV          92           90           89             PLT          217          254          254            Lab Test        01/02/22 01/01/22 06/05/19                       0729          1922          2130          NA           140          138          139           POTASSIUM    4.1          3.8          3.3*          CHLORIDE     108          103          102           CO2          29           32           32            BUN          9            11           10            CR           0.91         0.90         0.91          ANIONGAP     3            3            4             PARKER          8.4*         9.1          8.8           GLC          93           104*         116*            OUTSIDE LABS:  CBC:   Lab Results   Component Value Date    WBC 4.2 01/02/2022    WBC 4.1 01/01/2022    HGB 13.0 (L) 01/02/2022    HGB 14.8 01/01/2022    HCT 41.4 01/02/2022    HCT 45.2 01/01/2022     01/02/2022     01/01/2022     BMP:   Lab Results   Component Value Date     01/02/2022     01/01/2022    POTASSIUM 4.1 01/02/2022    POTASSIUM 3.8 01/01/2022    CHLORIDE 108 01/02/2022    CHLORIDE 103 01/01/2022    CO2 29 01/02/2022    CO2 32 01/01/2022    BUN 9 01/02/2022    BUN 11 01/01/2022    CR 0.91 01/02/2022    CR 0.90 01/01/2022    GLC 93 01/02/2022     (H) 01/01/2022     COAGS: No results found for: PTT, INR, FIBR  POC: No results found for: BGM, HCG, HCGS  HEPATIC:   Lab Results   Component Value Date    ALBUMIN 4.5 06/05/2019    PROTTOTAL 8.1 06/05/2019    ALT 27 06/05/2019    AST 16 06/05/2019    ALKPHOS 74 06/05/2019    BILITOTAL 0.7 06/05/2019     OTHER:   Lab Results   Component Value Date    PARKER 8.4 (L) 01/02/2022    LIPASE 71 (L) 06/05/2019    CRP <0.1 03/27/2019       Anesthesia Plan    ASA Status:  1      Anesthesia Type: General.     - Airway: LMA   Induction: Propofol.   Maintenance: Balanced.        Consents    Anesthesia Plan(s) and associated risks, benefits, and realistic alternatives discussed. Questions answered and  patient/representative(s) expressed understanding.    - Discussed:     - Discussed with:  Patient      - Extended Intubation/Ventilatory Support Discussed: No.      - Patient is DNR/DNI Status: No    Use of blood products discussed: No .     Postoperative Care    Pain management: IV analgesics, Oral pain medications.   PONV prophylaxis: Ondansetron (or other 5HT-3), Dexamethasone or Solumedrol, Background Propofol Infusion     Comments:                David Malone MD

## 2022-01-02 NOTE — ED TRIAGE NOTES
Pt here with L flank pain and dysuria since this afternoon. Seen last week with kidney stones. No n/v. ABC intact.

## 2022-01-02 NOTE — PLAN OF CARE
"Vital Signs: admitted to floor at 0942, Afebrile, VSS  Pain/Comfort: Describing pain in back to an 8, heating pad applied, pt declining IV pain med stating he was \"ok\"  Assessment: C/o some pain in R flank, heat and distraction used for pain/comfort  Diet: NPO prior to stent placement  Output: stated he had last voided early am, did not want to void pre surgery  Activity/Ambulation:Up ad olga to BR to use divya wipes for pre op antisepsis, steady on his feet  Social: Mother at bedside, requesting an  pre op  Plan: Pt to OR , pre op check list completed, may discharge from PACU  "

## 2022-01-02 NOTE — PHARMACY-ADMISSION MEDICATION HISTORY
Admission medication history interview status for this patient is complete. See [facility] admission navigator for allergy information, prior to admission medications and immunization status.     Medication history interview done, indicate source(s): Patient  Medication history resources (including written lists, pill bottles, clinic record):SureScripts and Care Everywhere  Pharmacy: [facility]    Changes made to PTA medication list:  Added: None  Changed: None  Reported as Not Taking: None  Removed: ibuprofen (from 2019)    Actions taken by pharmacist (provider contacted, etc): Spoke with pt to verify med list.     Additional medication history information: Pt reported not taking medications at home. Only taking pain medications that the doctor gave him, but no record of recent pain medications and pt is unsure what the medication is called. It could potentially be ibuprofen 600 mg. He takes 1 tablet at bedtime.    Medication reconciliation/reorder completed by provider prior to medication history?  Y   (Y/N)     For patients on insulin therapy: N    Prior to Admission medications    Not on File

## 2022-01-02 NOTE — H&P (VIEW-ONLY)
History   Chief Complaint:  Flank Pain       HPI   Brendan Hunt is a 22 year old male with history of kidney stone with surgical intervention who presents with back pain with radiation through his right flank pain to his abdomen and down his leg. He notes dysuria, difficulty urinating, and vomiting.  He reports that he was seen last week for this and diagnosed with a kidney stone. Mentions that he has been taking the pain medication that this doctor prescribed him. Denies fever, chills, chest pain, or cough.     Review of Systems   Constitutional: Negative for chills and fever.   Respiratory: Negative for cough.    Cardiovascular: Negative for chest pain.   Gastrointestinal: Positive for abdominal pain and vomiting.   Genitourinary: Positive for difficulty urinating, dysuria and flank pain.   Musculoskeletal: Positive for back pain.   All other systems reviewed and are negative.    Allergies:  The patient has no known allergies.     Medications:  The patient is not currently taking any prescribed medications.    Past Medical History:     Hydronephrosis  Pyelonephritis  Urolithiasis  Migraine with aura  Sepsis    Past Surgical History:    Cystoscopy with stent x3  Cystoscopy with ureteral stent removal    Social History:  The patient presents to the ED with his mother.     Physical Exam     Patient Vitals for the past 24 hrs:   BP Temp Temp src Pulse Resp SpO2   01/01/22 2115 -- -- -- -- -- 100 %   01/01/22 2100 127/76 -- -- 75 -- 100 %   01/01/22 2045 122/68 -- -- 71 -- 100 %   01/01/22 2030 125/82 -- -- 80 -- 100 %   01/01/22 2015 -- -- -- -- -- 100 %   01/01/22 2000 121/42 -- -- 85 -- 100 %   01/01/22 1820 120/61 98.5  F (36.9  C) Temporal 67 18 97 %       Physical Exam  General: Appears uncomfortable  Head: The scalp, face, and head appear normal  Eyes: The pupils are equal, round, and reactive to light. Conjunctivae and sclerae are normal  CV: Regular rate and rhythm.   Resp: Lungs are clear without  wheezes or rales. No respiratory distress.   GI: Abdomen is soft, no rigidity, guarding, or rebound. No distension. No tenderness to palpation in any quadrant.  No CVA tenderness   MS: Normal tone. Joints grossly normal without effusions. No asymmetric leg swelling, calf or thigh tenderness.    Skin: No rash or lesions noted. Normal capillary refill noted  Neuro: Speech is normal and fluent. Face is symmetric. Moving all extremities.   Psych:  Normal affect.  Appropriate interactions.    Emergency Department Course     Imaging:  CT Abdomen Pelvis w/o Contrast   Final Result   IMPRESSION:    1.  Right ureterovesicular junction 3 mm obstructing calculus with severe right hydroureteronephrosis.   2.  Additional small nonobstructing right renal calculi.           Report per radiology    Laboratory:  Labs Ordered and Resulted from Time of ED Arrival to Time of ED Departure   BASIC METABOLIC PANEL - Abnormal       Result Value    Sodium 138      Potassium 3.8      Chloride 103      Carbon Dioxide (CO2) 32      Anion Gap 3      Urea Nitrogen 11      Creatinine 0.90      Calcium 9.1      Glucose 104 (*)     GFR Estimate >90     ROUTINE UA WITH MICROSCOPIC REFLEX TO CULTURE - Abnormal    Color Urine Yellow      Appearance Urine Cloudy (*)     Glucose Urine Negative      Bilirubin Urine Negative      Ketones Urine Negative      Specific Gravity Urine 1.018      Blood Urine Negative      pH Urine 7.5 (*)     Protein Albumin Urine Negative      Urobilinogen Urine Normal      Nitrite Urine Negative      Leukocyte Esterase Urine Negative      Mucus Urine Present (*)     RBC Urine 7 (*)     WBC Urine <1     CBC WITH PLATELETS - Normal    WBC Count 4.1      RBC Count 5.05      Hemoglobin 14.8      Hematocrit 45.2      MCV 90      MCH 29.3      MCHC 32.7      RDW 11.7      Platelet Count 254     COVID-19 VIRUS (CORONAVIRUS) BY PCR      Emergency Department Course:    Reviewed:  I reviewed nursing notes, vitals, past medical history  and Care Everywhere    Assessments:  1910 I obtained history and examined the patient as noted above.     2031 I rechecked the patient and explained findings.     2134 I rechecked and updated the patient.     Consults:  2132 I spoke with Yareli CLAROS for Dr. Ellison, hospitalist service, who accepts the patient.     Interventions:  1922 NS 1L IV  1923 Zofran 4 mg IV  1923 Toradol 15 mg IV  2006 Dilaudid 0.5 mg IV  2046 Dilaudid 0.5 mg I  2125 Morphine 4 mg IV  2146 Morphine 4 mg IV    Disposition:  The patient was admitted to the hospital under the care of Dr. Ellison.     Impression & Plan     Medical Decision Making:  This is a 22-year-old male who presents emergency department with right flank pain that radiates into his groin and abdomen.  Also notes some dysuria and vomiting.  He reports that he was seen at this emergency department and was diagnosed with a kidney stone but I see no record of this in epic.  In any case the patient appears quite uncomfortable and in review of his chart has significant history of kidney stones.  On initial evaluation he is hemodynamically stable and afebrile.  CT scan was obtained which shows a 3 mm kidney stone at the right ureterovesicular junction with associated hydronephrosis.  UA does not show any signs of infection.  Blood work is otherwise reassuring including a normal creatinine.  Patient's pain was difficult to control in the emergency department therefore he will require admission for further symptomatic relief.  May require stone retrieval tomorrow if pain does not subside.  Discussed plan with the patient and his mother and they are agreeable at this time.  Questions were answered patient be admitted in stable condition.      Diagnosis:    ICD-10-CM    1. Kidney stone  N20.0        Scribe Disclosure:  ROZINA, Marciano Moreno, am serving as a scribe at 7:06 PM on 1/1/2022 to document services personally performed by Uday Samayoa MD based on my observations and the  provider's statements to me.             Uday Samayoa MD  01/01/22 7700

## 2022-01-02 NOTE — DISCHARGE SUMMARY
[facility]  Discharge Summary  Hospitalist      Date of Admission:  1/1/2022  Date of Discharge:  1/2/2022  Provider:  Dmitriy Palumbo MD. [facility]  Date of Service (when I last saw the patient): 01/02/22      Primary Provider: No Ref-Primary, Physician          Discharge Diagnosis:     Discharge Diagnoses     Renal colic secondary to right ureteral stone and hydronephrosis    Other medical issues:  Past Medical History:   Diagnosis Date     Headache 12/16/2019     Kidney stones      Migraine with aura and without status migrainosus, not intractable 12/16/2019     Nephrolithiasis 12/16/2019         Please see the admission history and physical for full details.     Hospital Course     Brendan Hunt was admitted on 1/1/2022.  The following problems were addressed during his hospitalization:  22 year old male with a hx of kidney stones, who was admitted on 1/1/2022 with 3 mm R obstructing ureteral stone with severe hydronephrosis.   Patient treated with IV fluid Flomax pain medication, he was seen by urology plan is for him to go to the operating room for stent placement this afternoon with discharge home after procedure if he is stable and doing well.  Follow-up with outpatient urology clinic.    Pending Results   Unresulted Labs Ordered in the Past 30 Days of this Admission     No orders found for last 31 day(s).          Discharge Orders   No discharge procedures on file.    Code Status   Full Code       Primary Care Physician   Physician No Ref-Primary    Physical Exam   Temp: 99.3  F (37.4  C) Temp src: Temporal BP: 120/61 Pulse: 82   Resp: 16 SpO2: 100 % O2 Device: None (Room air)    Vitals:    01/02/22 0713 01/02/22 0942   Weight: 56.6 kg (124 lb 12.8 oz) 61.7 kg (136 lb)     Vital Signs with Ranges  Temp:  [98.3  F (36.8  C)-99.3  F (37.4  C)] 99.3  F (37.4  C)  Pulse:  [67-86] 82  Resp:  [16-18] 16  BP: (120-147)/(42-82) 120/61  SpO2:  [97 %-100 %] 100 %  I/O last 3 completed  shifts:  In: 1000 [I.V.:1000]  Out: 300 [Urine:300]    Constitutional:  alert, cooperative, no apparent distress  Respiratory: No increased work of breathing, good air exchange, no crackles or wheezing.  Cardiovascular: apical impulse,normal S1 and S2  GI: bowel sounds present, soft, non-distended, non-tender      Discharge Disposition   Discharged to home    Consultations This Hospital Stay   UROLOGY IP CONSULT  SPIRITUAL HEALTH SERVICES IP CONSULT    Time Spent on this Encounter   I, Dmitriy Palumbo MD, personally saw the patient today and spent less than or equal to 30 minutes discharging this patient.      Discharge Medications     Refer to AVS      Allergies   No Known Allergies  Data   Most Recent 3 CBC's:Recent Labs   Lab Test 01/02/22  0729 01/01/22 1922 06/05/19  2130   WBC 4.2 4.1 7.1   HGB 13.0* 14.8 14.7   MCV 92 90 89    254 254      Most Recent 3 BMP's:  Recent Labs   Lab Test 01/02/22  0729 01/01/22 1922 06/05/19  2130    138 139   POTASSIUM 4.1 3.8 3.3*   CHLORIDE 108 103 102   CO2 29 32 32   BUN 9 11 10   CR 0.91 0.90 0.91   ANIONGAP 3 3 4   PARKER 8.4* 9.1 8.8   GLC 93 104* 116*     Most Recent 2 LFT's:  Recent Labs   Lab Test 06/05/19  2130   AST 16   ALT 27   ALKPHOS 74   BILITOTAL 0.7     Most Recent INR's and Anticoagulation Dosing History:  Anticoagulation Dose History    There is no flowsheet data to display.       Most Recent 3 Troponin's:No lab results found.  Most Recent Cholesterol Panel:No lab results found.  Most Recent 6 Bacteria Isolates From Any Culture (See EPIC Reports for Culture Details):No lab results found.  Most Recent TSH, T4 and A1c Labs:No lab results found.  Results for orders placed or performed during the hospital encounter of 01/01/22   CT Abdomen Pelvis w/o Contrast    Narrative    EXAM: CT ABDOMEN PELVIS W/O CONTRAST  LOCATION: [facility]  DATE/TIME: 1/1/2022 7:42 PM    INDICATION: Flank pain, kidney stone suspected. Right-sided  flank pain.  COMPARISON: None.  TECHNIQUE: CT scan of the abdomen and pelvis was performed without IV contrast. Multiplanar reformats were obtained. Dose reduction techniques were used.  CONTRAST: None.    FINDINGS:   LOWER CHEST: Normal.    HEPATOBILIARY: Normal.    PANCREAS: Normal.    SPLEEN: Normal.    ADRENAL GLANDS: Normal.    KIDNEYS/BLADDER: Right ureterovesicular junction 3 mm obstructing calculus with severe right hydroureteronephrosis and perinephric stranding. Cluster of nonobstructing right renal calculi measuring up to 9 mm. No left renal calculi or hydronephrosis.    BOWEL: No obstruction or inflammatory change.    LYMPH NODES: No lymphadenopathy.    PELVIC ORGANS: Normal.    MUSCULOSKELETAL: Normal.      Impression    IMPRESSION:   1.  Right ureterovesicular junction 3 mm obstructing calculus with severe right hydroureteronephrosis.  2.  Additional small nonobstructing right renal calculi.               Disclaimer: This note consists of symbols derived from keyboarding, dictation and/or voice recognition software. As a result, there may be errors in the script that have gone undetected. Please consider this when interpreting information found in this chart.

## 2022-01-02 NOTE — ED NOTES
Pt started complaining about being itchy all over - Possibly after morphine? SOPHIE Morrison aware, given benadryl. Will continue to monitor. No complaints of any throat/mouth being itchy. No hoarseness. Complaints of arm and leg pins and needles.

## 2022-01-02 NOTE — ED NOTES
Per patient benadryl helping the itchiness. IVF infusing.    To strain urine when patient voids - non thus far.     Pt resting comfortably in bed

## 2022-01-02 NOTE — H&P
[facility]    History and Physical - Hospitalist Service       Date of Admission:  1/1/2022    Assessment & Plan      Brendan Hunt is a 22 year old male with a hx of kidney stones, who was admitted on 1/1/2022 with 3 mm R obstructing ureteral stone with severe hydronephrosis.     1. Renal colic, 3 mm R ureteral stone with severe hydronephrosis  Hx of kidney stones, has required cystoscopy in the past, unclear if he has spontaneously passed stones before. Renal function within normal limits, UA does not appear infected, afebrile.   -admit to obs  - IVF, NS @ 125ml/hr  - flomax   - pain control and supportive cares  - strain urine  - Urology consult  - prn benadryl for itching due to narcotics    Covid-19 pending     Diet:   NPO after midnight  DVT Prophylaxis: Low Risk/Ambulatory with no VTE prophylaxis indicated  Metz Catheter: Not present  Central Lines: None  Code Status:   full code    Clinically Significant Risk Factors Present on Admission                    Disposition Plan   Expected Discharge: tomorrow likely  Anticipated discharge location:  Awaiting care coordination huddle  Delays: none anticipated        The patient's care was discussed with the Bedside Nurse, Patient, Patient's Family and ED provider, Dr. Samayoa.    Yareli Morrison PA-C  [facility]  Securely message with the Vocera Web Console (learn more here)  Text page via [employer] Paging/Directory        ______________________________________________________________________    Chief Complaint   R flank pain    History is obtained from the patient    History of Present Illness   Brendan Hunt is a 22 year old male with a hx of kidney stones who presents to the ED with R flank pain since 1400 today. He notes stabbing pain in his right flank with radiation to his testicles. He denies fever, chills, chest pain, SOB, abd pain, nausea, vomiting, diarrhea, dysuria or hematuria.     Review of  Systems    The 10 point Review of Systems is negative other than noted in the HPI or here.     Past Medical History    I have reviewed this patient's medical history and updated it with pertinent information if needed.   Past Medical History:   Diagnosis Date     Headache 12/16/2019     Kidney stones      Migraine with aura and without status migrainosus, not intractable 12/16/2019     Nephrolithiasis 12/16/2019       Past Surgical History   I have reviewed this patient's surgical history and updated it with pertinent information if needed.  Cystoscopy     Social History   I have reviewed this patient's social history and updated it with pertinent information if needed.  Social History     Tobacco Use     Smoking status: Current Every Day Smoker     Smokeless tobacco: Never Used   Substance Use Topics     Alcohol use: Not on file     Drug use: Not on file       Family History     Reviewed and non contributory    Prior to Admission Medications   Prior to Admission Medications   Prescriptions Last Dose Informant Patient Reported? Taking?   ibuprofen (ADVIL/MOTRIN) 600 MG tablet   No No   Sig: Take 1 tablet (600 mg) by mouth every 6 hours as needed for moderate pain      Facility-Administered Medications: None     Allergies   No Known Allergies    Physical Exam   Vital Signs: Temp: 98.5  F (36.9  C) Temp src: Temporal BP: 127/76 Pulse: 75   Resp: 18 SpO2: 100 % O2 Device: None (Room air)    Weight: 0 lbs 0 oz    GENERAL:  Comfortable.  PSYCH: pleasant, oriented, No acute distress.  HEENT:  Atraumatic, normocephalic. Normal conjunctiva, normal hearing, and oropharynx is normal.  NECK:  Supple, no neck vein distention.  HEART:  Normal S1, S2 with no murmur, no pericardial rub, gallops or S3 or S4.  LUNGS:  Clear to auscultation, normal Respiratory effort. No wheezing, rales or ronchi.  GI:  Soft, normal bowel sounds. R flank tenderness, non distended.   EXTREMITIES:  No pedal edema, +2 pulses bilateral and equal.  SKIN:   Dry to touch, No rash, wound or ulcerations.  NEUROLOGIC:  CN 2-12 intact, BL 5/5 symmetric upper and lower extremity strength, sensation is intact with no focal deficits.       Data   Data reviewed today: I reviewed all medications, new labs and imaging results over the last 24 hours. I personally reviewed the abdominal CT image(s) showing 3 mm R ureteral stone with severe hydronephrosis.    Most Recent 3 CBC's:Recent Labs   Lab Test 01/01/22 1922 06/05/19 2130 03/27/19 2027   WBC 4.1 7.1 4.3   HGB 14.8 14.7 13.7*   MCV 90 89 89    254 262     Most Recent 3 BMP's:Recent Labs   Lab Test 01/01/22 1922 06/05/19 2130 03/27/19 2027    139 140   POTASSIUM 3.8 3.3* 3.9   CHLORIDE 103 102 102   CO2 32 32 27   BUN 11 10 9   CR 0.90 0.91 0.97   ANIONGAP 3 4 11   PARKER 9.1 8.8 9.2   * 116* 95     Most Recent 2 LFT's:Recent Labs   Lab Test 06/05/19 2130   AST 16   ALT 27   ALKPHOS 74   BILITOTAL 0.7     Most Recent 6 Bacteria Isolates From Any Culture (See EPIC Reports for Culture Details):No lab results found.  Most Recent Urinalysis:Recent Labs   Lab Test 01/01/22 1922 02/12/20  1120   COLOR Yellow Yellow   APPEARANCE Cloudy* Clear   URINEGLC Negative Negative   URINEBILI Negative Negative   URINEKETONE Negative Negative   SG 1.018 1.025   UBLD Negative Negative   URINEPH 7.5* 7.0   PROTEIN Negative Negative   UROBILINOGEN  --  0.2 E.U./dL   NITRITE Negative Negative   LEUKEST Negative Negative   RBCU 7*  --    WBCU <1  --      Recent Results (from the past 24 hour(s))   CT Abdomen Pelvis w/o Contrast    Narrative    EXAM: CT ABDOMEN PELVIS W/O CONTRAST  LOCATION: [facility]  DATE/TIME: 1/1/2022 7:42 PM    INDICATION: Flank pain, kidney stone suspected. Right-sided flank pain.  COMPARISON: None.  TECHNIQUE: CT scan of the abdomen and pelvis was performed without IV contrast. Multiplanar reformats were obtained. Dose reduction techniques were used.  CONTRAST: None.    FINDINGS:    LOWER CHEST: Normal.    HEPATOBILIARY: Normal.    PANCREAS: Normal.    SPLEEN: Normal.    ADRENAL GLANDS: Normal.    KIDNEYS/BLADDER: Right ureterovesicular junction 3 mm obstructing calculus with severe right hydroureteronephrosis and perinephric stranding. Cluster of nonobstructing right renal calculi measuring up to 9 mm. No left renal calculi or hydronephrosis.    BOWEL: No obstruction or inflammatory change.    LYMPH NODES: No lymphadenopathy.    PELVIC ORGANS: Normal.    MUSCULOSKELETAL: Normal.      Impression    IMPRESSION:   1.  Right ureterovesicular junction 3 mm obstructing calculus with severe right hydroureteronephrosis.  2.  Additional small nonobstructing right renal calculi.

## 2022-01-02 NOTE — CONSULTS
Urology Consult    Name: Brendan Hunt    MRN: [id number]   YOB: 2000               Chief Complaint:   Urolithiasis    History is obtained from the patient and chart review          History of Present Illness:   Brendan Hunt is a 22 year old male with a history of urolithiasis who has had R flank pain for the past week. The patient initially presented to an outside ED one week ago with R flank pain, similar to when he has had stones in the past. He was discharged but continued to have R flank pain and n/v, prompting him to come back to the ED last night. CT A/P was obtained which showed a 3mm obstructing R UVJ stone with significant hydronephrosis, as well as some non-obstructing R renal stones. The patient has no evidence of infection or PIETRO. He continues to have RLQ and R flank pain today. Of note te patient did require URS/HLL for a 3mm stone in [place] in 2019.      Recent Labs   Lab 01/02/22  0729 01/01/22 1922   WBC 4.2 4.1     Recent Labs   Lab 01/02/22  0729 01/01/22 1922   CR 0.91 0.90     Urinalysis  Recent Labs   Lab Test 01/01/22  1922 02/12/20  1120 06/05/19  2344 03/27/19 2018   UBLD Negative Negative Moderate* Negative   NITRITE Negative Negative Negative Negative   LEUKEST Negative Negative Negative Small*   RBCU 7*  --  6* 0-2   WBCU <1  --  1 0-5              Past Medical History:     Past Medical History:   Diagnosis Date     Headache 12/16/2019     Kidney stones      Migraine with aura and without status migrainosus, not intractable 12/16/2019     Nephrolithiasis 12/16/2019            Past Surgical History:   No past surgical history on file.         Social History:     Social History     Tobacco Use     Smoking status: Current Every Day Smoker     Smokeless tobacco: Never Used   Substance Use Topics     Alcohol use: Not on file            Family History:   No family history on file.   no family history of stone disease         Allergies:   No Known Allergies          Medications:     Current Facility-Administered Medications   Medication     acetaminophen (TYLENOL) tablet 650 mg    Or     acetaminophen (TYLENOL) Suppository 650 mg     diphenhydrAMINE (BENADRYL) capsule 25 mg     HYDROmorphone (PF) (DILAUDID) injection 0.3-0.5 mg     lidocaine (LMX4) cream     lidocaine 1 % 0.1-1 mL     melatonin tablet 1 mg     ondansetron (ZOFRAN-ODT) ODT tab 4 mg    Or     ondansetron (ZOFRAN) injection 4 mg     oxyCODONE (ROXICODONE) tablet 5 mg     polyethylene glycol (MIRALAX) Packet 17 g     senna-docusate (SENOKOT-S/PERICOLACE) 8.6-50 MG per tablet 1 tablet    Or     senna-docusate (SENOKOT-S/PERICOLACE) 8.6-50 MG per tablet 2 tablet     sodium chloride (PF) 0.9% PF flush 3 mL     sodium chloride (PF) 0.9% PF flush 3 mL     sodium chloride 0.9% infusion     tamsulosin (FLOMAX) capsule 0.4 mg             Review of Systems:   Review Of Systems  Skin: negative  Eyes: negative  Ears/Nose/Throat: negative  Respiratory: No shortness of breath, dyspnea on exertion, cough, or hemoptysis  Cardiovascular: negative  Gastrointestinal: negative  Genitourinary: as above  Musculoskeletal: negative  Neurologic: negative  Psychiatric: negative  Hematologic/Lymphatic/Immunologic: negative  Endocrine: =negative            Physical Exam:   VS:  T: 98.3    HR: 75    BP: 131/77    RR: 16   GEN:  AOx3.  NAD.  Pleasant.  HEENT:  Sclerae anicteric.  Conjunctivae pink.  MMM.  NECK:  Supple.  No LAD.  CV:  RRR  LUNGS: Non-labored breathing.  BACK:  No midline or CVA tenderness.  ABD:  Mild RLQ TTP  EXT:  Warm, well perfused.    SKIN:  Warm.  Dry.  No rashes.  NEURO:  CN grossly intact.  LUKAS..          Data:   All laboratory data reviewed and highlighted above:    All pertinent imaging reviewed:  CT scan of the abdomen:  INDICATION: Flank pain, kidney stone suspected. Right-sided flank pain.  COMPARISON: None.  TECHNIQUE: CT scan of the abdomen and pelvis was performed without IV contrast. Multiplanar reformats  were obtained. Dose reduction techniques were used.  CONTRAST: None.     FINDINGS:   LOWER CHEST: Normal.     HEPATOBILIARY: Normal.     PANCREAS: Normal.     SPLEEN: Normal.     ADRENAL GLANDS: Normal.     KIDNEYS/BLADDER: Right ureterovesicular junction 3 mm obstructing calculus with severe right hydroureteronephrosis and perinephric stranding. Cluster of nonobstructing right renal calculi measuring up to 9 mm. No left renal calculi or hydronephrosis.     BOWEL: No obstruction or inflammatory change.     LYMPH NODES: No lymphadenopathy.     PELVIC ORGANS: Normal.     MUSCULOSKELETAL: Normal.                                                                      IMPRESSION:   1.  Right ureterovesicular junction 3 mm obstructing calculus with severe right hydroureteronephrosis.  2.  Additional small nonobstructing right renal calculi.         Impression and Plan:   Impression:   Brendan Hunt is a 22 year old male with a right 3mm obstructing UVJ stone with pain for over one week. This is his second presentation to the ED for symptoms.     We covered the natural history of kidney stones, the risk of progression to symptomatic pain/infection, and the possibility of renal failure/kidney damage.  We covered treatment options including observation, ureteroscopy, extracorporeal shock wave lithotripsy (ESWL), and percutaneous nephrolithotomy (PCNL).  We covered surgical risks which include but are not limited to heart attack, stroke, blood clot in the legs or lungs, death, injury to surrounding organs and tissues, low risk of bleeding. Additional procedures may be necessary in the perioperative period.      Plan:   -Add on to OR today for right URS/HLL/stent placement.  -Continue tamsulosin and continue to strain urine. If pt passes the stone in the interim procedure may be cancelled.   -NPO until surgery.  -Pt can likely discharge after surgery.     Rafael Pink MD  Urology Resident     Discussed with Dr. Bartlett

## 2022-01-03 VITALS
WEIGHT: 136 LBS | TEMPERATURE: 98.1 F | SYSTOLIC BLOOD PRESSURE: 121 MMHG | RESPIRATION RATE: 16 BRPM | OXYGEN SATURATION: 99 % | HEART RATE: 67 BPM | BODY MASS INDEX: 19.51 KG/M2 | DIASTOLIC BLOOD PRESSURE: 86 MMHG

## 2022-01-03 NOTE — OR NURSING
Discharge assessment and discharge instructions assisted by Karoline Zuluaga  #[id number]  Patient and Mom reported need for Taxi. When told the wait would be 30 minutes to 3 hours, They said that family would pick them up. Patient and Mom left by [employer] at [phone]

## 2022-01-03 NOTE — ANESTHESIA POSTPROCEDURE EVALUATION
Patient: Brendan Hunt    Procedure: Procedure(s):  CYSTOSCOPY, RIGHT URETEROSCOPY       Diagnosis:Kidney stone [N20.0]  Diagnosis Additional Information: No value filed.    Anesthesia Type:  General    Note:  Disposition: Outpatient   Postop Pain Control: Uneventful            Sign Out: Well controlled pain   PONV: No   Neuro/Psych: Uneventful            Sign Out: Acceptable/Baseline neuro status   Airway/Respiratory: Uneventful            Sign Out: Acceptable/Baseline resp. status   CV/Hemodynamics: Uneventful            Sign Out: Acceptable CV status   Other NRE: NONE   DID A NON-ROUTINE EVENT OCCUR? No           Last vitals:  Vitals Value Taken Time   /65 01/02/22 1915   Temp 98  F (36.7  C) 01/02/22 1835   Pulse 92 01/02/22 1916   Resp 16 01/02/22 1916   SpO2 99 % 01/02/22 1916   Vitals shown include unvalidated device data.    Electronically Signed By: Meet Marie MD  January 2, 2022  7:17 PM

## 2022-01-03 NOTE — ANESTHESIA CARE TRANSFER NOTE
Patient: Brendan Hunt    Procedure: Procedure(s):  CYSTOSCOPY, RIGHT URETEROSCOPY       Diagnosis: Kidney stone [N20.0]  Diagnosis Additional Information: No value filed.    Anesthesia Type:   General     Note:    Oropharynx: oral airway in place and spontaneously breathing  Level of Consciousness: drowsy  Oxygen Supplementation: face mask  Level of Supplemental Oxygen (L/min / FiO2): 6  Independent Airway: airway patency satisfactory and stable  Dentition: dentition unchanged  Vital Signs Stable: post-procedure vital signs reviewed and stable  Report to RN Given: handoff report given  Patient transferred to: PACU  Comments: Exchanging   Handoff Report: Identifed the Patient, Identified the Reponsible Provider, Reviewed the pertinent medical history, Discussed the surgical course, Reviewed Intra-OP anesthesia mangement and issues during anesthesia and Allowed opportunity for questions and acknowledgement of understanding      Vitals:  Vitals Value Taken Time   BP     Temp     Pulse     Resp 32 01/02/22 1836   SpO2 100 % 01/02/22 1836   Vitals shown include unvalidated device data.    Electronically Signed By: CLEMENTINA Avilez CRNA  January 2, 2022  6:38 PM

## 2022-01-03 NOTE — OR NURSING
Dr Marie notified of pain issues, request Toradol. Order given for Toradol and Tylenol. Patient then complains of nausea and patient request for a shot in his leg. Order given for Phenergan/Ephedrine IM medication.  Given with relief of nausea.

## 2022-01-03 NOTE — OP NOTE
OPERATIVE REPORT 1/2/2022    PREOPERATIVE DIAGNOSIS: Right urolithiasis    POSTOPERATIVE DIAGNOSIS:  Same    PROCEDURES PERFORMED:   1. Cystourethroscopy  2. Right ureteroscopy with stone extraction    STAFF SURGEON: Sanna Bartlett MD    RESIDENT: Rafael Pink MD    ANESTHESIA: General    ESTIMATED BLOOD LOSS: 2 mL.     DRAINS: None    SPECIMENS: Right ureteral stone    FINDINGS: 3mm stone in the bladder. No stone in the ureter.    OPERATIVE INDICATIONS:   Brendan Hunt is a 22 year old male who presented with a right obstructing ureteral stone. The patient was counseled on the alternatives, risks, and benefits and elected to proceed with the above stated procedure.    DESCRIPTION OF PROCEDURE:    After informed consent was obtained, the patient was taken to the operating room, and moved to the operating table.  After adequate anesthesia was induced, the patient was repositioned in dorsal lithotomy position and prepped and draped in the usual sterile fashion. A timeout was taken to confirm correct patient, procedure and laterality.     A 22-[place] cystoscope was inserted into a well lubricated urethra. The urethra was unremarkable.  The bladder was free of tumors, stones or diverticuli.  The media was clear.  Bilateral ureteral orifices were orthotopic.  A Sensor guidewire was advanced into the right renal pelvis with the aid of a 5-[place] open ended ureteral catheter. A ureteroscope was advanced into the ureter. The ureteroscope was advanced all the way to the renal pelvis and no stones were visible. We then looked around the bladder and identified a 3mm stone which was presumably the aforementioned stone which had passed into the bladder. The stone was removed and sent for analysis. The bladder was then drained and the procedure concluded. The patient tolerated the procedure well.  There were no complications.       PLAN:   - Discharge to home  - Follow up in urology clinic to review stone analysis    Rafael  MD Apryl  Urology Resident

## 2022-01-03 NOTE — OR NURSING
Dr Pink called about Patient's concern for which pharmacy the prescription was e-scribed to. E-script was changed to [facility] in [place]. Pain was addressed and this was re-enforced to the patient that some pain is expected.  This information was assisted by Karoline  Almaz #[id number].

## 2022-01-03 NOTE — DISCHARGE INSTRUCTIONS
GENERAL ANESTHESIA OR SEDATION ADULT DISCHARGE INSTRUCTIONS   SPECIAL PRECAUTIONS FOR 24 HOURS AFTER SURGERY    IT IS NOT UNUSUAL TO FEEL LIGHT-HEADED OR FAINT, UP TO 24 HOURS AFTER SURGERY OR WHILE TAKING PAIN MEDICATION.  IF YOU HAVE THESE SYMPTOMS; SIT FOR A FEW MINUTES BEFORE STANDING AND HAVE SOMEONE ASSIST YOU WHEN YOU GET UP TO WALK OR USE THE BATHROOM.    YOU SHOULD REST AND RELAX FOR THE NEXT 24 HOURS AND YOU MUST MAKE ARRANGEMENTS TO HAVE SOMEONE STAY WITH YOU FOR AT LEAST 24 HOURS AFTER YOUR DISCHARGE.  AVOID HAZARDOUS AND STRENUOUS ACTIVITIES.  DO NOT MAKE IMPORTANT DECISIONS FOR 24 HOURS.    DO NOT DRIVE ANY VEHICLE OR OPERATE MECHANICAL EQUIPMENT FOR 24 HOURS FOLLOWING THE END OF YOUR SURGERY.  EVEN THOUGH YOU MAY FEEL NORMAL, YOUR REACTIONS MAY BE AFFECTED BY THE MEDICATION YOU HAVE RECEIVED.    DO NOT DRINK ALCOHOLIC BEVERAGES FOR 24 HOURS FOLLOWING YOUR SURGERY.    DRINK CLEAR LIQUIDS (APPLE JUICE, GINGER ALE, 7-UP, BROTH, ETC.).  PROGRESS TO YOUR REGULAR DIET AS YOU FEEL ABLE.    YOU MAY HAVE A DRY MOUTH, A SORE THROAT, MUSCLES ACHES OR TROUBLE SLEEPING.  THESE SHOULD GO AWAY AFTER 24 HOURS.    CALL YOUR DOCTOR FOR ANY OF THE FOLLOWING:  SIGNS OF INFECTION (FEVER, GROWING TENDERNESS AT THE SURGERY SITE, A LARGE AMOUNT OF DRAINAGE OR BLEEDING, SEVERE PAIN, FOUL-SMELLING DRAINAGE, REDNESS OR SWELLING.    IT HAS BEEN OVER 8 TO 10 HOURS SINCE SURGERY AND YOU ARE STILL NOT ABLE TO URINATE (PASS WATER).     Maximum acetaminophen (Tylenol) dose from all sources should not exceed 4 grams (4000 mg) per day.  You received 975 mg at 8:40 pm.  Your next dose is available anytime after 2:40 am.    You received Toradol, an IV form of Ibuprofen (Motrin) at 8:40 pm.  Do not take any Ibuprofen products until 2:40 am.

## 2022-01-05 LAB
APPEARANCE STONE: NORMAL
COMPN STONE: NORMAL
SPECIMEN WT: 5 MG

## 2022-01-10 ENCOUNTER — TELEPHONE (OUTPATIENT)
Dept: UROLOGY | Facility: CLINIC | Age: 22
End: 2022-01-10
Payer: COMMERCIAL

## 2022-01-10 NOTE — TELEPHONE ENCOUNTER
----- Message from Rafael Pink MD sent at 1/2/2022  6:37 PM CST -----  Regarding: schedule virtual or in person follow up in 3-4 weeks to review stone analysis  Hello,     This patient had a kidney stone removed with Dr. Bartlett on 1/2/2022. He does not have a stent. Can you schedule a virtual visit with one of the PA's in 3-4 weeks to review stone composition?    Thanks,  Rafael Pink (urology resident)

## 2022-01-25 ENCOUNTER — PRE VISIT (OUTPATIENT)
Dept: UROLOGY | Facility: CLINIC | Age: 22
End: 2022-01-25
Payer: COMMERCIAL

## 2022-01-25 NOTE — CONFIDENTIAL NOTE
Reason for visit: post-op cystourethroscopy right ureteroscopy with stone extraction, review stone composition     Relevant information: ureterolithiasis     Records/imaging/labs/orders: in epic    Pt called: n/a    At Rooming: virtual

## 2022-01-26 ENCOUNTER — VIRTUAL VISIT (OUTPATIENT)
Dept: UROLOGY | Facility: CLINIC | Age: 22
End: 2022-01-26
Payer: COMMERCIAL

## 2022-01-26 DIAGNOSIS — N20.0 KIDNEY STONE: Primary | ICD-10-CM

## 2022-01-26 PROCEDURE — 99215 OFFICE O/P EST HI 40 MIN: CPT | Mod: 95 | Performed by: UROLOGY

## 2022-01-26 NOTE — LETTER
Date:January 27, 2022      Patient was self referred, no letter generated. Do not send.        [facility] Health Information

## 2022-01-26 NOTE — PROGRESS NOTES
Brendan is a 22 year old who is being evaluated via a billable video visit.      How would you like to obtain your AVS? Mail a copy  If the video visit is dropped, the invitation should be resent by: Text to cell phone: [phone]  Will anyone else be joining your video visit? No      Video Start Time: 0500  Video-Visit Details  .  Urology Clinic     HPI  Brendan Hunt is a 22 year old male with history of kidney stones, here for follow-up.      I met him first on 1/12/2022 and took him to OR for cystoscopy and stone removal.     Intraoperative findings: 3mm stone in the bladder. No stone in the ureter.    He is now here for follow up and to discuss the stone analysis.     No changes to health, hospitalizations or new diagnoses in the interim    PHYSICAL EXAM    No vitals were obtained or physical exam was done today due to virtual visit.    Labs  Lab Results   Component Value Date    WBC 4.2 01/02/2022    WBC 7.1 06/05/2019     Lab Results   Component Value Date    RBC 4.48 01/02/2022    RBC 5.07 06/05/2019     Lab Results   Component Value Date    HGB 13.0 01/02/2022    HGB 14.7 06/05/2019     Lab Results   Component Value Date    HCT 41.4 01/02/2022    HCT 45.0 06/05/2019     No components found for: MCT  Lab Results   Component Value Date    MCV 92 01/02/2022    MCV 89 06/05/2019     Lab Results   Component Value Date    MCH 29.0 01/02/2022    MCH 29.0 06/05/2019     Lab Results   Component Value Date    MCHC 31.4 01/02/2022    MCHC 32.7 06/05/2019     Lab Results   Component Value Date    RDW 11.6 01/02/2022    RDW 11.7 06/05/2019     Lab Results   Component Value Date     01/02/2022     06/05/2019        Last Comprehensive Metabolic Panel:  Sodium   Date Value Ref Range Status   01/02/2022 140 133 - 144 mmol/L Final   06/05/2019 139 133 - 144 mmol/L Final     Potassium   Date Value Ref Range Status   01/02/2022 4.1 3.4 - 5.3 mmol/L Final   06/05/2019 3.3 (L) 3.4 - 5.3 mmol/L Final      Chloride   Date Value Ref Range Status   01/02/2022 108 94 - 109 mmol/L Final   06/05/2019 102 94 - 109 mmol/L Final     Carbon Dioxide   Date Value Ref Range Status   06/05/2019 32 20 - 32 mmol/L Final     Carbon Dioxide (CO2)   Date Value Ref Range Status   01/02/2022 29 20 - 32 mmol/L Final     Anion Gap   Date Value Ref Range Status   01/02/2022 3 3 - 14 mmol/L Final   06/05/2019 4 3 - 14 mmol/L Final     Glucose   Date Value Ref Range Status   01/02/2022 93 70 - 99 mg/dL Final   06/05/2019 116 (H) 70 - 99 mg/dL Final     Urea Nitrogen   Date Value Ref Range Status   01/02/2022 9 7 - 30 mg/dL Final   06/05/2019 10 7 - 30 mg/dL Final     Creatinine   Date Value Ref Range Status   01/02/2022 0.91 0.66 - 1.25 mg/dL Final   06/05/2019 0.91 0.66 - 1.25 mg/dL Final     GFR Estimate   Date Value Ref Range Status   01/02/2022 >90 >60 mL/min/1.73m2 Final     Comment:     Effective December 21, 2021 eGFRcr in adults is calculated using the 2021 CKD-EPI creatinine equation which includes age and gender (Harjinder et al., NEJ, DOI: 10.1056/BKMLby3191128)   06/05/2019 >90 >60 mL/min/[1.73_m2] Final     Comment:     Non  GFR Calc  Starting 12/18/2018, serum creatinine based estimated GFR (eGFR) will be   calculated using the Chronic Kidney Disease Epidemiology Collaboration   (CKD-EPI) equation.       Calcium   Date Value Ref Range Status   01/02/2022 8.4 (L) 8.5 - 10.1 mg/dL Final   06/05/2019 8.8 8.5 - 10.1 mg/dL Final     Bilirubin Total   Date Value Ref Range Status   06/05/2019 0.7 0.2 - 1.3 mg/dL Final     Alkaline Phosphatase   Date Value Ref Range Status   06/05/2019 74 40 - 150 U/L Final     ALT   Date Value Ref Range Status   06/05/2019 27 0 - 70 U/L Final     AST   Date Value Ref Range Status   06/05/2019 16 0 - 45 U/L Final       Stone Composition  See Note     Comment: Calculi composed primarily of:   10% calcium oxalate (monohydrate and dihydrate),   10% calcium monohydrogen phosphate dihydrate  (brushite), and   80% calcium phosphate (hydroxy- and carbonate- apatite).         Imaging   None to review today     ASSESSMENT AND PLAN  22 year old male with history of kidney stone with calcium oxalate/phosphate stone     We discussed different types of stone compositions and the significance of change in lifestyle including hydration. Since this is the second stone episode for him, it is reasonable to have a 24 hr urine collection to guide us with future recommendations re stone prevention methods and medications     Plan   Collect litholink   Referral to Dr. García for long term management     40 total minutes spent on the date of the encounter including direct interaction with the patient, performing chart review, documentation and further activities as noted above    Sanna Bartlett MD   Department of Urology   [facility]                      Type of service:  Video Visit    Video End Time:0515    Originating Location (pt. Location): Other phone     Distant Location (provider location):  [facility] [place]     Platform used for Video Visit: CDP

## 2022-01-26 NOTE — LETTER
1/26/2022       RE: Brendan Hunt  [address]  [address]  [place] [place] [facility] [address]     Dear Colleague,    Thank you for referring your patient, Brendan Hunt, to the [facility] [place] at [facility]. Please see a copy of my visit note below.    Brendan is a 22 year old who is being evaluated via a billable video visit.      How would you like to obtain your AVS? Mail a copy  If the video visit is dropped, the invitation should be resent by: Text to cell phone: [phone]  Will anyone else be joining your video visit? No      Video Start Time: 0500  Video-Visit Details  .  Urology Clinic     HPI  Brendan Hunt is a 22 year old male with history of kidney stones, here for follow-up.      I met him first on 1/12/2022 and took him to OR for cystoscopy and stone removal.     Intraoperative findings: 3mm stone in the bladder. No stone in the ureter.    He is now here for follow up and to discuss the stone analysis.     No changes to health, hospitalizations or new diagnoses in the interim    PHYSICAL EXAM    No vitals were obtained or physical exam was done today due to virtual visit.    Labs  Lab Results   Component Value Date    WBC 4.2 01/02/2022    WBC 7.1 06/05/2019     Lab Results   Component Value Date    RBC 4.48 01/02/2022    RBC 5.07 06/05/2019     Lab Results   Component Value Date    HGB 13.0 01/02/2022    HGB 14.7 06/05/2019     Lab Results   Component Value Date    HCT 41.4 01/02/2022    HCT 45.0 06/05/2019     No components found for: MCT  Lab Results   Component Value Date    MCV 92 01/02/2022    MCV 89 06/05/2019     Lab Results   Component Value Date    MCH 29.0 01/02/2022    MCH 29.0 06/05/2019     Lab Results   Component Value Date    MCHC 31.4 01/02/2022    MCHC 32.7 06/05/2019     Lab Results   Component Value Date    RDW 11.6 01/02/2022    RDW 11.7 06/05/2019     Lab Results   Component Value Date     01/02/2022      06/05/2019        Last Comprehensive Metabolic Panel:  Sodium   Date Value Ref Range Status   01/02/2022 140 133 - 144 mmol/L Final   06/05/2019 139 133 - 144 mmol/L Final     Potassium   Date Value Ref Range Status   01/02/2022 4.1 3.4 - 5.3 mmol/L Final   06/05/2019 3.3 (L) 3.4 - 5.3 mmol/L Final     Chloride   Date Value Ref Range Status   01/02/2022 108 94 - 109 mmol/L Final   06/05/2019 102 94 - 109 mmol/L Final     Carbon Dioxide   Date Value Ref Range Status   06/05/2019 32 20 - 32 mmol/L Final     Carbon Dioxide (CO2)   Date Value Ref Range Status   01/02/2022 29 20 - 32 mmol/L Final     Anion Gap   Date Value Ref Range Status   01/02/2022 3 3 - 14 mmol/L Final   06/05/2019 4 3 - 14 mmol/L Final     Glucose   Date Value Ref Range Status   01/02/2022 93 70 - 99 mg/dL Final   06/05/2019 116 (H) 70 - 99 mg/dL Final     Urea Nitrogen   Date Value Ref Range Status   01/02/2022 9 7 - 30 mg/dL Final   06/05/2019 10 7 - 30 mg/dL Final     Creatinine   Date Value Ref Range Status   01/02/2022 0.91 0.66 - 1.25 mg/dL Final   06/05/2019 0.91 0.66 - 1.25 mg/dL Final     GFR Estimate   Date Value Ref Range Status   01/02/2022 >90 >60 mL/min/1.73m2 Final     Comment:     Effective December 21, 2021 eGFRcr in adults is calculated using the 2021 CKD-EPI creatinine equation which includes age and gender (Harjinder et al., NEJM, DOI: 10.1056/GBAPzc9845900)   06/05/2019 >90 >60 mL/min/[1.73_m2] Final     Comment:     Non  GFR Calc  Starting 12/18/2018, serum creatinine based estimated GFR (eGFR) will be   calculated using the Chronic Kidney Disease Epidemiology Collaboration   (CKD-EPI) equation.       Calcium   Date Value Ref Range Status   01/02/2022 8.4 (L) 8.5 - 10.1 mg/dL Final   06/05/2019 8.8 8.5 - 10.1 mg/dL Final     Bilirubin Total   Date Value Ref Range Status   06/05/2019 0.7 0.2 - 1.3 mg/dL Final     Alkaline Phosphatase   Date Value Ref Range Status   06/05/2019 74 40 - 150 U/L Final     ALT    Date Value Ref Range Status   06/05/2019 27 0 - 70 U/L Final     AST   Date Value Ref Range Status   06/05/2019 16 0 - 45 U/L Final       Stone Composition  See Note     Comment: Calculi composed primarily of:   10% calcium oxalate (monohydrate and dihydrate),   10% calcium monohydrogen phosphate dihydrate (brushite), and   80% calcium phosphate (hydroxy- and carbonate- apatite).         Imaging   None to review today     ASSESSMENT AND PLAN  22 year old male with history of kidney stone with calcium oxalate/phosphate stone     We discussed different types of stone compositions and the significance of change in lifestyle including hydration. Since this is the second stone episode for him, it is reasonable to have a 24 hr urine collection to guide us with future recommendations re stone prevention methods and medications     Plan   Collect litholink   Referral to Dr. García for long term management     40 total minutes spent on the date of the encounter including direct interaction with the patient, performing chart review, documentation and further activities as noted above    Sanna Bartlett MD   Department of Urology   [facility]                      Type of service:  Video Visit    Video End Time:0515    Originating Location (pt. Location): Other phone     Distant Location (provider location):  [facility] [place]     Platform used for Video Visit: Doximity      Again, thank you for allowing me to participate in the care of your patient.      Sincerely,    Sanna Bartlett MD

## 2022-01-26 NOTE — PATIENT INSTRUCTIONS
Please follow up with Dr. García.    We have placed an order for the Litholink 24-hour urine collection      It was a pleasure meeting with you today.  Thank you for allowing me and my team the privilege of caring for you today.  YOU are the reason we are here, and I truly hope we provided you with the excellent service you deserve.  Please let us know if there is anything else we can do for you so that we can be sure you are leaving completely satisfied with your care experience.               If you do not receive the LithoLink in 7-10 days please call [phone].   Once you complete the kit and return it, please contact us on [employer] or call [phone] to schedule a follow up appointment if one is not already made.  The results of the 24 hr urine collection will not appear on [employer]ognitum.     [facility] is a laboratory that specializes in 24-hour urine testing for kidney stone  formers. Your provider has requested that you complete a Litholink At-Home kit. The  At-Home kit will be shipped directly to your home (or address provided). Your provider  is waiting on these test results in order to start your kidney stone treatment plan.  Things to know about your Litholink At-Home kit:      Expect your At -Home kit to arrive 5-7 business days from the date the order was  placed.  Your At-Home kit will include everything you need to complete your 24-hour urine  collection(s). Detailed instructions, collection supplies, return shipping box, and a  pre-paid Fed-Ex label are being sent directly to you.    If you are planning to begin your At-Home kit immediately upon receipt, note the  Followin. Eat and drink normally the day before you start your collection and during  the collection process.    2. Stop taking Vitamin C (pill form, vitamins, and/or supplements) that is  greater than 100 mg per day 5 days prior to the start of your A t-Home kit.  Vitamin C occurring in foods and drinks can be ingested as  normal.    Upon completing and returning your A t-Home kit allow 7-10 days for your provider  to receive your Litholink results.    [email or web address]

## 2022-01-28 ENCOUNTER — TELEPHONE (OUTPATIENT)
Dept: UROLOGY | Facility: CLINIC | Age: 22
End: 2022-01-28
Payer: COMMERCIAL

## 2022-02-04 ENCOUNTER — VIRTUAL VISIT (OUTPATIENT)
Dept: UROLOGY | Facility: CLINIC | Age: 22
End: 2022-02-04
Payer: COMMERCIAL

## 2022-02-04 ENCOUNTER — TELEPHONE (OUTPATIENT)
Dept: UROLOGY | Facility: CLINIC | Age: 22
End: 2022-02-04

## 2022-02-04 ENCOUNTER — LAB (OUTPATIENT)
Dept: LAB | Facility: CLINIC | Age: 22
End: 2022-02-04
Payer: COMMERCIAL

## 2022-02-04 DIAGNOSIS — Z11.59 ENCOUNTER FOR SCREENING FOR OTHER VIRAL DISEASES: Primary | ICD-10-CM

## 2022-02-04 DIAGNOSIS — N20.0 RIGHT NEPHROLITHIASIS: ICD-10-CM

## 2022-02-04 DIAGNOSIS — N20.0 RIGHT NEPHROLITHIASIS: Primary | ICD-10-CM

## 2022-02-04 PROCEDURE — 87086 URINE CULTURE/COLONY COUNT: CPT | Mod: 90 | Performed by: PATHOLOGY

## 2022-02-04 PROCEDURE — 99214 OFFICE O/P EST MOD 30 MIN: CPT | Mod: GT | Performed by: UROLOGY

## 2022-02-04 PROCEDURE — 99000 SPECIMEN HANDLING OFFICE-LAB: CPT | Performed by: PATHOLOGY

## 2022-02-04 NOTE — PROGRESS NOTES
Brendan is a 22 year old who is being evaluated via a billable video visit.      How would you like to obtain your AVS? Mail a copy  If the video visit is dropped, the invitation should be resent by: Text to cell phone: [phone]  Will anyone else be joining your video visit? No      Video Start Time: 8:46 AM  Video-Visit Details    Type of service:  Video Visit    Video End Time:8:59 AM    Originating Location (pt. Location): Home    Distant Location (provider location):  [facility]     Platform used for Video Visit: [email or web address]    Name: Brendan Hunt   MRN: [id number]  YOB: 2000    Assessment and Plan:  22 year old male with persistent right flank pain and dysuria after recent ureteroscopy.    1. Right nephrolithiasis  2. Right flank pain  3. Dysuria  - Urine Culture Aerobic Bacterial; Future  - Case Request: CYSTOURETEROSCOPY, WITH LITHOTRIPSY USING LASER AND URETERAL STENT INSERTION; Standing  - Case Request: CYSTOURETEROSCOPY, WITH LITHOTRIPSY USING LASER AND URETERAL STENT INSERTION    Will obtain a urine culture to rule out UTI given symptoms and recent procedure.    Discussed options for management of the remaining right renal stone and he would like it removed.  Discussed ureteroscopy and risks, including but not limited to, bleeding, infection, need for stent/stent related symptoms, injury to ureter, need for second procedure.  He is interested in proceeding.    Postoperatively, will do a metabolic eval to assess for risk factors given age and recurrent stone formation.     Plan:  -urine culture  [place] [facility] [place]op follow-up with lithglen, KUB/renal US and visit    Charles García MD  February 4, 2022         Chief Complaint: Stone follow-up    History of Present Illness:  Brendan Hunt is a 22 year old male seen after right ureteroscopy and stone passage on 1/2/2022.  He had a 3 mm ureteral stone that passed into bladder at time of  "ureteroscopy.  The 9 mm renal stone/collection was not removed at the time of the procedure.  He does have hx of procedure in 2019 for stones as well.     Postoperatively:  Stent was not placed  without issue. Since surgery, they did no have an unplanned clinic visit, did no visit the emergency department,  did no get readmitted, did no require additional unplanned procedure.    Having trouble urinating and having right flank pain (8/10 pain) since procedure. No nausea, no vomiting, no hematuria.They have not experienced recent stone passage.      Metabolic:  Medical risk factors: prior stone formation  Stone analysis: 10% COM/COD, 10% Brushite, 80% CAP  Blood work:  Sodium   Date Value Ref Range Status   01/02/2022 140 133 - 144 mmol/L Final   06/05/2019 139 133 - 144 mmol/L Final     Potassium   Date Value Ref Range Status   01/02/2022 4.1 3.4 - 5.3 mmol/L Final   06/05/2019 3.3 (L) 3.4 - 5.3 mmol/L Final     Chloride   Date Value Ref Range Status   01/02/2022 108 94 - 109 mmol/L Final   06/05/2019 102 94 - 109 mmol/L Final     Creatinine   Date Value Ref Range Status   01/02/2022 0.91 0.66 - 1.25 mg/dL Final   06/05/2019 0.91 0.66 - 1.25 mg/dL Final     Calcium   Date Value Ref Range Status   01/02/2022 8.4 (L) 8.5 - 10.1 mg/dL Final   06/05/2019 8.8 8.5 - 10.1 mg/dL Final           Allergies:  No Known Allergies         Medications:  Current Outpatient Medications   Medication Sig     diclofenac (CATAFLAM) 50 MG tablet Take 1 tablet (50 mg) by mouth 3 times daily as needed for moderate pain     No current facility-administered medications for this visit.       Review of Systems:   ROS: 10 point ROS neg other than the symptoms noted above in the HPI.    Physical Exam:  B/P: Data Unavailable, T: Data Unavailable, P: Data Unavailable, R: Data Unavailable  Estimated body mass index is 19.51 kg/m  as calculated from the following:    Height as of 2/12/20: 1.778 m (5' 10\").    Weight as of 1/2/22: 61.7 kg (136 " lb).  General Appearance Adult: Alert, no acute distress, oriented  Lungs: no respiratory distress, or pursed lip breathing  Neuro: Alert, oriented, speech and mentation normal  Psych: affect and mood normal

## 2022-02-04 NOTE — NURSING NOTE
Chief Complaint   Patient presents with     Follow Up       Patient Active Problem List   Diagnosis     Nephrolithiasis     Migraine with aura and without status migrainosus, not intractable     Kidney stone       No Known Allergies    Current Outpatient Medications   Medication Sig Dispense Refill     diclofenac (CATAFLAM) 50 MG tablet Take 1 tablet (50 mg) by mouth 3 times daily as needed for moderate pain 10 tablet 0       Social History     Tobacco Use     Smoking status: Current Every Day Smoker     Smokeless tobacco: Never Used   Substance Use Topics     Alcohol use: Not on file     Drug use: Not on file       Eneida Marvin LPN  2/4/2022  8:32 AM

## 2022-02-04 NOTE — LETTER
2/4/2022       RE: Brendan Hunt  [address]  [address]  [place] [facility] [address]     Dear Colleague,    Thank you for referring your patient, Brendan Hunt, to the [facility] [place] at [facility]. Please see a copy of my visit note below.    Brendan is a 22 year old who is being evaluated via a billable video visit.      How would you like to obtain your AVS? Mail a copy  If the video visit is dropped, the invitation should be resent by: Text to cell phone: [phone]  Will anyone else be joining your video visit? No      Video Start Time: 8:46 AM  Video-Visit Details    Type of service:  Video Visit    Video End Time:8:59 AM    Originating Location (pt. Location): Home    Distant Location (provider location):  [facility]     Platform used for Video Visit: Where[employer]    Name: Brendan Hunt   MRN: [id number]  YOB: 2000    Assessment and Plan:  22 year old male with persistent right flank pain and dysuria after recent ureteroscopy.    1. Right nephrolithiasis  2. Right flank pain  3. Dysuria  - Urine Culture Aerobic Bacterial; Future  - Case Request: CYSTOURETEROSCOPY, WITH LITHOTRIPSY USING LASER AND URETERAL STENT INSERTION; Standing  - Case Request: CYSTOURETEROSCOPY, WITH LITHOTRIPSY USING LASER AND URETERAL STENT INSERTION    Will obtain a urine culture to rule out UTI given symptoms and recent procedure.    Discussed options for management of the remaining right renal stone and he would like it removed.  Discussed ureteroscopy and risks, including but not limited to, bleeding, infection, need for stent/stent related symptoms, injury to ureter, need for second procedure.  He is interested in proceeding.    Postoperatively, will do a metabolic eval to assess for risk factors given age and recurrent stone formation.     Plan:  -urine culture  [place] [facility] [place]op follow-up  with annabella, KUB/renal US and visit    Charles García MD  February 4, 2022         Chief Complaint: Stone follow-up    History of Present Illness:  Brendan Hunt is a 22 year old male seen after right ureteroscopy and stone passage on 1/2/2022.  He had a 3 mm ureteral stone that passed into bladder at time of ureteroscopy.  The 9 mm renal stone/collection was not removed at the time of the procedure.  He does have hx of procedure in 2019 for stones as well.     Postoperatively:  Stent was not placed  without issue. Since surgery, they did no have an unplanned clinic visit, did no visit the emergency department,  did no get readmitted, did no require additional unplanned procedure.    Having trouble urinating and having right flank pain (8/10 pain) since procedure. No nausea, no vomiting, no hematuria.They have not experienced recent stone passage.      Metabolic:  Medical risk factors: prior stone formation  Stone analysis: 10% COM/COD, 10% Brushite, 80% CAP  Blood work:  Sodium   Date Value Ref Range Status   01/02/2022 140 133 - 144 mmol/L Final   06/05/2019 139 133 - 144 mmol/L Final     Potassium   Date Value Ref Range Status   01/02/2022 4.1 3.4 - 5.3 mmol/L Final   06/05/2019 3.3 (L) 3.4 - 5.3 mmol/L Final     Chloride   Date Value Ref Range Status   01/02/2022 108 94 - 109 mmol/L Final   06/05/2019 102 94 - 109 mmol/L Final     Creatinine   Date Value Ref Range Status   01/02/2022 0.91 0.66 - 1.25 mg/dL Final   06/05/2019 0.91 0.66 - 1.25 mg/dL Final     Calcium   Date Value Ref Range Status   01/02/2022 8.4 (L) 8.5 - 10.1 mg/dL Final   06/05/2019 8.8 8.5 - 10.1 mg/dL Final           Allergies:  No Known Allergies         Medications:  Current Outpatient Medications   Medication Sig     diclofenac (CATAFLAM) 50 MG tablet Take 1 tablet (50 mg) by mouth 3 times daily as needed for moderate pain     No current facility-administered medications for this visit.       Review of Systems:   ROS: 10 point ROS  "neg other than the symptoms noted above in the HPI.    Physical Exam:  B/P: Data Unavailable, T: Data Unavailable, P: Data Unavailable, R: Data Unavailable  Estimated body mass index is 19.51 kg/m  as calculated from the following:    Height as of 2/12/20: 1.778 m (5' 10\").    Weight as of 1/2/22: 61.7 kg (136 lb).  General Appearance Adult: Alert, no acute distress, oriented  Lungs: no respiratory distress, or pursed lip breathing  Neuro: Alert, oriented, speech and mentation normal  Psych: affect and mood normal        Again, thank you for allowing me to participate in the care of your patient.      Sincerely,    Charles García MD      "

## 2022-02-04 NOTE — LETTER
Date:February 6, 2022      Patient was self referred, no letter generated. Do not send.        [facility] Health Information

## 2022-02-05 LAB — BACTERIA UR CULT: NORMAL

## 2022-02-05 NOTE — CONFIDENTIAL NOTE
Patient is scheduled for surgery with Dr. Alfaro    Spoke with: Prabha rosa-op coordinator spoke w/patient and his appointment care coordinator Colin Swartz    Date of Surgery: Wednesday 02/09/22    Location: [facility] OR    Informed patient they will need an adult  Yes    Pre op with Provider amber    H&P: Scheduled with Patient had H&P 01/01/22. Dr. Alfaro will update H&P DOS if needed.     Pre-procedure COVID-19 Test: Saturday 02/05/22 [facility] lab    Additional imaging/appointments: amber    Surgery packet: patient was instructed to  surgery packet at the [facility]  desk on Saturday 002/05/22     Additional comments: na

## 2022-02-07 ENCOUNTER — PATIENT OUTREACH (OUTPATIENT)
Dept: UROLOGY | Facility: CLINIC | Age: 22
End: 2022-02-07
Payer: COMMERCIAL

## 2022-02-07 ENCOUNTER — LAB (OUTPATIENT)
Dept: LAB | Facility: CLINIC | Age: 22
End: 2022-02-07
Attending: UROLOGY
Payer: COMMERCIAL

## 2022-02-07 DIAGNOSIS — Z11.59 ENCOUNTER FOR SCREENING FOR OTHER VIRAL DISEASES: ICD-10-CM

## 2022-02-07 PROCEDURE — U0005 INFEC AGEN DETEC AMPLI PROBE: HCPCS | Mod: 90 | Performed by: PATHOLOGY

## 2022-02-07 PROCEDURE — 99000 SPECIMEN HANDLING OFFICE-LAB: CPT | Performed by: PATHOLOGY

## 2022-02-07 PROCEDURE — U0003 INFECTIOUS AGENT DETECTION BY NUCLEIC ACID (DNA OR RNA); SEVERE ACUTE RESPIRATORY SYNDROME CORONAVIRUS 2 (SARS-COV-2) (CORONAVIRUS DISEASE [COVID-19]), AMPLIFIED PROBE TECHNIQUE, MAKING USE OF HIGH THROUGHPUT TECHNOLOGIES AS DESCRIBED BY CMS-2020-01-R: HCPCS | Mod: 90 | Performed by: PATHOLOGY

## 2022-02-07 NOTE — TELEPHONE ENCOUNTER
Pre Op Teaching Flowsheet       Pre and Post op Patient Education  Relevant Diagnosis:  stone  Surgical procedure:  Laser litho  Teaching Topic:  Pre and post op teaching  Person Involved in teaching: Yes    Motivation Level:  Asks Questions: Yes  Eager to Learn: Yes  Cooperative: Yes  Receptive (willing/able to accept information):  Yes    Patient demonstrates understanding of the following:  Date of surgery:  2/9  Location of surgery:  [facility] - 5th Floor  History and Physical and any other testing necessary prior to surgery: Yes  Required time line for completion of History and Physical and any pre-op testing: Yes    Patient demonstrates understanding of the following:  Pre-op bowel prep:  N/A  Pre-op showering/scrub information with PCMX Soap: Yes  Blood thinner medications discussed and when to stop (if applicable):  N/A  Discussed no visitor's at this time due to increase Covid-19 cases and how we need to make sure everyone stays safe.    Infection Prevention:   Patient demonstrates understanding of the following:  Surgical procedure site care taught: Yes  Signs and symptoms of infection taught: Yes      Post-op follow-up:  Discussed how to contact the hospital, nurse, and clinic scheduling staff if necessary. (See packet information)    Instructional materials used/given/mailed:  [facility] Packet, post op teaching sheet, Map, Soap, and with the arrival/location information to come closer to the surgery date.    Surgical instructions packet given to patient in office:  N/A    Follow up: Discussed arranging for someone to drive you home. ( No public transportation)  Someone needed to stay the first twenty hours after surgery: Yes     referral: no     home:  yes    Care Giver:  yes    PCP:  n/a

## 2022-02-08 ENCOUNTER — ANESTHESIA EVENT (OUTPATIENT)
Dept: SURGERY | Facility: AMBULATORY SURGERY CENTER | Age: 22
End: 2022-02-08
Payer: COMMERCIAL

## 2022-02-08 LAB — SARS-COV-2 RNA RESP QL NAA+PROBE: NEGATIVE

## 2022-02-09 ENCOUNTER — HOSPITAL ENCOUNTER (OUTPATIENT)
Facility: AMBULATORY SURGERY CENTER | Age: 22
End: 2022-02-09
Attending: UROLOGY
Payer: COMMERCIAL

## 2022-02-09 ENCOUNTER — ANESTHESIA (OUTPATIENT)
Dept: SURGERY | Facility: AMBULATORY SURGERY CENTER | Age: 22
End: 2022-02-09
Payer: COMMERCIAL

## 2022-02-09 ENCOUNTER — ANCILLARY PROCEDURE (OUTPATIENT)
Dept: RADIOLOGY | Facility: AMBULATORY SURGERY CENTER | Age: 22
End: 2022-02-09
Attending: UROLOGY
Payer: COMMERCIAL

## 2022-02-09 VITALS
SYSTOLIC BLOOD PRESSURE: 107 MMHG | WEIGHT: 136 LBS | BODY MASS INDEX: 19.47 KG/M2 | RESPIRATION RATE: 18 BRPM | OXYGEN SATURATION: 99 % | HEIGHT: 70 IN | TEMPERATURE: 97.6 F | DIASTOLIC BLOOD PRESSURE: 69 MMHG | HEART RATE: 81 BPM

## 2022-02-09 DIAGNOSIS — N20.0 KIDNEY STONE: Primary | ICD-10-CM

## 2022-02-09 DIAGNOSIS — R39.89 URINARY PROBLEM: ICD-10-CM

## 2022-02-09 PROCEDURE — 52332 CYSTOSCOPY AND TREATMENT: CPT | Mod: RT

## 2022-02-09 PROCEDURE — 52332 CYSTOSCOPY AND TREATMENT: CPT | Mod: RT | Performed by: UROLOGY

## 2022-02-09 PROCEDURE — 52351 CYSTOURETERO & OR PYELOSCOPE: CPT | Mod: RT

## 2022-02-09 PROCEDURE — 74420 UROGRAPHY RTRGR +-KUB: CPT | Mod: 26 | Performed by: UROLOGY

## 2022-02-09 PROCEDURE — 52351 CYSTOURETERO & OR PYELOSCOPE: CPT | Mod: RT | Performed by: UROLOGY

## 2022-02-09 PROCEDURE — 74420 UROGRAPHY RTRGR +-KUB: CPT | Mod: TC

## 2022-02-09 DEVICE — STENT URETERAL PERCUFLEX PLUS 6FRX26CM M0061752630: Type: IMPLANTABLE DEVICE | Site: ABDOMEN | Status: FUNCTIONAL

## 2022-02-09 RX ORDER — HYDROMORPHONE HYDROCHLORIDE 1 MG/ML
0.2 INJECTION, SOLUTION INTRAMUSCULAR; INTRAVENOUS; SUBCUTANEOUS EVERY 5 MIN PRN
Status: DISCONTINUED | OUTPATIENT
Start: 2022-02-09 | End: 2022-02-09 | Stop reason: HOSPADM

## 2022-02-09 RX ORDER — HYDRALAZINE HYDROCHLORIDE 20 MG/ML
2.5-5 INJECTION INTRAMUSCULAR; INTRAVENOUS EVERY 10 MIN PRN
Status: DISCONTINUED | OUTPATIENT
Start: 2022-02-09 | End: 2022-02-09 | Stop reason: HOSPADM

## 2022-02-09 RX ORDER — ONDANSETRON 4 MG/1
4 TABLET, ORALLY DISINTEGRATING ORAL EVERY 30 MIN PRN
Status: DISCONTINUED | OUTPATIENT
Start: 2022-02-09 | End: 2022-02-10 | Stop reason: HOSPADM

## 2022-02-09 RX ORDER — GLYCOPYRROLATE 0.2 MG/ML
INJECTION, SOLUTION INTRAMUSCULAR; INTRAVENOUS PRN
Status: DISCONTINUED | OUTPATIENT
Start: 2022-02-09 | End: 2022-02-09

## 2022-02-09 RX ORDER — SODIUM CHLORIDE, SODIUM LACTATE, POTASSIUM CHLORIDE, CALCIUM CHLORIDE 600; 310; 30; 20 MG/100ML; MG/100ML; MG/100ML; MG/100ML
INJECTION, SOLUTION INTRAVENOUS CONTINUOUS
Status: DISCONTINUED | OUTPATIENT
Start: 2022-02-09 | End: 2022-02-10 | Stop reason: HOSPADM

## 2022-02-09 RX ORDER — SODIUM CHLORIDE, SODIUM LACTATE, POTASSIUM CHLORIDE, CALCIUM CHLORIDE 600; 310; 30; 20 MG/100ML; MG/100ML; MG/100ML; MG/100ML
INJECTION, SOLUTION INTRAVENOUS CONTINUOUS
Status: DISCONTINUED | OUTPATIENT
Start: 2022-02-09 | End: 2022-02-09 | Stop reason: HOSPADM

## 2022-02-09 RX ORDER — GABAPENTIN 300 MG/1
300 CAPSULE ORAL
Status: DISCONTINUED | OUTPATIENT
Start: 2022-02-09 | End: 2022-02-09 | Stop reason: HOSPADM

## 2022-02-09 RX ORDER — DEXAMETHASONE SODIUM PHOSPHATE 4 MG/ML
INJECTION, SOLUTION INTRA-ARTICULAR; INTRALESIONAL; INTRAMUSCULAR; INTRAVENOUS; SOFT TISSUE PRN
Status: DISCONTINUED | OUTPATIENT
Start: 2022-02-09 | End: 2022-02-09

## 2022-02-09 RX ORDER — OXYCODONE HYDROCHLORIDE 5 MG/1
5 TABLET ORAL EVERY 4 HOURS PRN
Status: DISCONTINUED | OUTPATIENT
Start: 2022-02-09 | End: 2022-02-10 | Stop reason: HOSPADM

## 2022-02-09 RX ORDER — MEPERIDINE HYDROCHLORIDE 25 MG/ML
12.5 INJECTION INTRAMUSCULAR; INTRAVENOUS; SUBCUTANEOUS
Status: DISCONTINUED | OUTPATIENT
Start: 2022-02-09 | End: 2022-02-10 | Stop reason: HOSPADM

## 2022-02-09 RX ORDER — ACETAMINOPHEN 325 MG/1
975 TABLET ORAL ONCE
Status: DISCONTINUED | OUTPATIENT
Start: 2022-02-09 | End: 2022-02-09 | Stop reason: HOSPADM

## 2022-02-09 RX ORDER — PROPOFOL 10 MG/ML
INJECTION, EMULSION INTRAVENOUS PRN
Status: DISCONTINUED | OUTPATIENT
Start: 2022-02-09 | End: 2022-02-09

## 2022-02-09 RX ORDER — LIDOCAINE HYDROCHLORIDE 20 MG/ML
INJECTION, SOLUTION INFILTRATION; PERINEURAL PRN
Status: DISCONTINUED | OUTPATIENT
Start: 2022-02-09 | End: 2022-02-09

## 2022-02-09 RX ORDER — CEFAZOLIN SODIUM 2 G/50ML
2 SOLUTION INTRAVENOUS SEE ADMIN INSTRUCTIONS
Status: DISCONTINUED | OUTPATIENT
Start: 2022-02-09 | End: 2022-02-09 | Stop reason: HOSPADM

## 2022-02-09 RX ORDER — ONDANSETRON 2 MG/ML
INJECTION INTRAMUSCULAR; INTRAVENOUS PRN
Status: DISCONTINUED | OUTPATIENT
Start: 2022-02-09 | End: 2022-02-09

## 2022-02-09 RX ORDER — LIDOCAINE 40 MG/G
CREAM TOPICAL
Status: DISCONTINUED | OUTPATIENT
Start: 2022-02-09 | End: 2022-02-09 | Stop reason: HOSPADM

## 2022-02-09 RX ORDER — FENTANYL CITRATE 50 UG/ML
25 INJECTION, SOLUTION INTRAMUSCULAR; INTRAVENOUS
Status: DISCONTINUED | OUTPATIENT
Start: 2022-02-09 | End: 2022-02-10 | Stop reason: HOSPADM

## 2022-02-09 RX ORDER — TAMSULOSIN HYDROCHLORIDE 0.4 MG/1
0.4 CAPSULE ORAL DAILY
Qty: 2 CAPSULE | Refills: 0 | Status: ON HOLD | OUTPATIENT
Start: 2022-02-09 | End: 2022-02-15

## 2022-02-09 RX ORDER — FENTANYL CITRATE 50 UG/ML
INJECTION, SOLUTION INTRAMUSCULAR; INTRAVENOUS PRN
Status: DISCONTINUED | OUTPATIENT
Start: 2022-02-09 | End: 2022-02-09

## 2022-02-09 RX ORDER — METOPROLOL TARTRATE 1 MG/ML
1-2 INJECTION, SOLUTION INTRAVENOUS EVERY 5 MIN PRN
Status: DISCONTINUED | OUTPATIENT
Start: 2022-02-09 | End: 2022-02-09 | Stop reason: HOSPADM

## 2022-02-09 RX ORDER — FENTANYL CITRATE 50 UG/ML
25 INJECTION, SOLUTION INTRAMUSCULAR; INTRAVENOUS EVERY 5 MIN PRN
Status: DISCONTINUED | OUTPATIENT
Start: 2022-02-09 | End: 2022-02-09 | Stop reason: HOSPADM

## 2022-02-09 RX ORDER — CEFAZOLIN SODIUM 2 G/50ML
2 SOLUTION INTRAVENOUS
Status: COMPLETED | OUTPATIENT
Start: 2022-02-09 | End: 2022-02-09

## 2022-02-09 RX ORDER — OXYBUTYNIN CHLORIDE 5 MG/1
5 TABLET ORAL 3 TIMES DAILY PRN
Qty: 6 TABLET | Refills: 0 | Status: ON HOLD | OUTPATIENT
Start: 2022-02-09 | End: 2022-02-17

## 2022-02-09 RX ORDER — PROPOFOL 10 MG/ML
INJECTION, EMULSION INTRAVENOUS CONTINUOUS PRN
Status: DISCONTINUED | OUTPATIENT
Start: 2022-02-09 | End: 2022-02-09

## 2022-02-09 RX ORDER — ONDANSETRON 2 MG/ML
4 INJECTION INTRAMUSCULAR; INTRAVENOUS EVERY 30 MIN PRN
Status: DISCONTINUED | OUTPATIENT
Start: 2022-02-09 | End: 2022-02-10 | Stop reason: HOSPADM

## 2022-02-09 RX ADMIN — PROPOFOL 200 MG: 10 INJECTION, EMULSION INTRAVENOUS at 10:01

## 2022-02-09 RX ADMIN — LIDOCAINE HYDROCHLORIDE 100 MG: 20 INJECTION, SOLUTION INFILTRATION; PERINEURAL at 10:01

## 2022-02-09 RX ADMIN — CEFAZOLIN SODIUM 2 G: 2 SOLUTION INTRAVENOUS at 10:02

## 2022-02-09 RX ADMIN — OXYCODONE HYDROCHLORIDE 5 MG: 5 TABLET ORAL at 11:42

## 2022-02-09 RX ADMIN — Medication 100 MCG: at 10:09

## 2022-02-09 RX ADMIN — Medication 100 MCG: at 10:18

## 2022-02-09 RX ADMIN — GLYCOPYRROLATE 0.2 MG: 0.2 INJECTION, SOLUTION INTRAMUSCULAR; INTRAVENOUS at 10:04

## 2022-02-09 RX ADMIN — FENTANYL CITRATE 50 MCG: 50 INJECTION, SOLUTION INTRAMUSCULAR; INTRAVENOUS at 10:01

## 2022-02-09 RX ADMIN — Medication 100 MCG: at 10:02

## 2022-02-09 RX ADMIN — ONDANSETRON 4 MG: 2 INJECTION INTRAMUSCULAR; INTRAVENOUS at 10:04

## 2022-02-09 RX ADMIN — DEXAMETHASONE SODIUM PHOSPHATE 4 MG: 4 INJECTION, SOLUTION INTRA-ARTICULAR; INTRALESIONAL; INTRAMUSCULAR; INTRAVENOUS; SOFT TISSUE at 10:04

## 2022-02-09 RX ADMIN — SODIUM CHLORIDE, SODIUM LACTATE, POTASSIUM CHLORIDE, CALCIUM CHLORIDE: 600; 310; 30; 20 INJECTION, SOLUTION INTRAVENOUS at 09:55

## 2022-02-09 RX ADMIN — PROPOFOL 150 MCG/KG/MIN: 10 INJECTION, EMULSION INTRAVENOUS at 10:01

## 2022-02-09 ASSESSMENT — MIFFLIN-ST. JEOR: SCORE: 1613.14

## 2022-02-09 ASSESSMENT — LIFESTYLE VARIABLES: TOBACCO_USE: 1

## 2022-02-09 NOTE — ANESTHESIA POSTPROCEDURE EVALUATION
Patient: Brendan Hunt    Procedure: Procedure(s):  CYSTOURETEROSCOPY, RIGHT RETROGRADE, URETEROSCOPY, LASER STANDBY, RIGHT URETERAL STENT PLACEMENT       Diagnosis:Right nephrolithiasis [N20.0]  Diagnosis Additional Information: No value filed.    Anesthesia Type:  General    Note:  Disposition: Outpatient   Postop Pain Control: Uneventful            Sign Out: Well controlled pain   PONV: No   Neuro/Psych: Uneventful            Sign Out: Acceptable/Baseline neuro status   Airway/Respiratory: Uneventful            Sign Out: Acceptable/Baseline resp. status   CV/Hemodynamics: Uneventful            Sign Out: Acceptable CV status   Other NRE: NONE   DID A NON-ROUTINE EVENT OCCUR? No           Last vitals:  Vitals Value Taken Time   /70 02/09/22 1116   Temp 36.6  C (97.8  F) 02/09/22 1116   Pulse 81 02/09/22 1115   Resp 16 02/09/22 1116   SpO2 100 % 02/09/22 1116   Vitals shown include unvalidated device data.    Electronically Signed By: Jose Dill MD  February 9, 2022  2:03 PM

## 2022-02-09 NOTE — DISCHARGE INSTRUCTIONS
[facility]  Home Care Following Anesthesia  For 24 hours after surgery:  1. Get plenty of rest.  A responsible adult must stay with you for at least 24 hours after you leave the surgery center.  2. Do not drive or use heavy equipment.  If you have weakness or tingling, don't drive or use heavy equipment until this feeling goes away.   3. Do not drink alcohol.   4. Avoid strenuous or risky activities.  Ask for help when climbing stairs.  5. You may feel lightheaded.  IF so, sit for a few minutes before standing.  Have someone help you get up.   6. If you have nausea (feel sick to your stomach): Drink only clear liquids such as apple juice, ginger ale, broth or 7-Up.  Rest may also help.  Be sure to drink enough fluids.  Move to a regular diet as you feel able.   7. You may have a slight fever.  Call the doctor if your fever is over 100 F (37.7 C) (taken under the tongue) or lasts longer than 24 hours.  8. You may have a dry mouth, a sore throat, muscle aches or trouble sleeping. These should go away after 24 hours.  9. Do not make important or legal decisions.   10. It is recommended to avoid smoking.               Tips for taking pain medications  To get the best pain relief possible, remember these points:    Take pain medications as directed, before pain becomes severe.    Pain medication can upset your stomach: taking it with food may help.    Constipation is a common side effect of pain medication. Drink plenty of  fluids.    Eat foods high in fiber. Take a stool softener if recommended by your doctor or pharmacist.    Do not drink alcohol, drive or operate machinery while taking pain medications.    Ask about other ways to control pain, such as with heat, ice or relaxation.    Tylenol/Acetaminophen Consumption  To help encourage the safe use of acetaminophen, the makers of TYLENOL  have lowered the maximum daily dose for single-ingredient Extra Strength TYLENOL   (acetaminophen) products sold in the [place] from 8 pills per day (4,000 mg) to 6 pills per day (3,000 mg). The dosing interval has also changed from 2 pills every 4-6 hours to 2 pills every 6 hours.    If you feel your pain relief is insufficient, you may take Tylenol/Acetaminophen in addition to your narcotic pain medication.     Be careful not to exceed 3,000 mg of Tylenol/Acetaminophen in a 24 hour period from all sources.    If you are taking extra strength Tylenol/acetaminophen (500 mg), the maximum dose is 6 tablets in 24 hours.    If you are taking regular strength acetaminophen (325 mg), the maximum dose is 9 tablets in 24 hours.    Call a doctor for any of the followin. Signs of infection (fever, growing tenderness at the surgery site, a large amount of drainage or bleeding, severe pain, foul-smelling drainage, redness, swelling).  2. It has been over 8 to 10 hours since surgery and you are still not able to urinate (pass water).  3. Headache for over 24 hours.  4. Numbness, tingling or weakness the day after surgery (if you had spinal anesthesia).  5. Signs of Covid-19 infection (temperature over 100 degrees, shortness of breath, cough, loss of taste/smell, generalized body aches, persistent headache, chills, sore throat, nausea/vomiting/diarrhea)  Your doctor is:  Dr. Curtis Alfaro, Prostate and Urology: [phone]                    Or dial [phone] and ask for the resident on call for:  Prostate Urology  For emergency care, call the:  [facility]:  [phone] (TTY for hearing impaired: [phone])            Stent/Ureteroscopy:    Activity  - There are no activity restrictions    Diet:  You may resume your regular diet.     Common symptoms related to the stent:  - You will likely notice some blood in the urine for as long as the stent is in place. You may also notice more blood in the urine after physical activity. Normal urine color can range from yellow to dark red.  This is not problematic as long as you are able to empty your bladder. Although urine may seem quite bloody, a few drops of blood can color the entire toilet bowl red- much like food coloring. Increase your fluid intake to help flush the blood out of your bladder.   - You may also notice a twinge of pain in your kidney during urination. This can be severe, but should get better after the first few days with the stent. This is due to urine traveling backward (up the stent into your kidney) when the bladder squeezes. It is normal and not a cause for concern.  - You may feel like you need to urinate more frequently than usual. Some patients experience a lower abdominal cramping or  spasm . You may notice urine leakage from your urethra after this happens. This is due to the stent rubbing against your bladder wall and should get better over the next few days. Additionally, your doctor may prescribe a medication to help with this called oxybutynin.   - You may experience some burning with urination due to minor trauma from the scope used during your surgery. This should disappear over the next few days.     Medications:  Anti-inflammatories/NSAIDs (Ibuprofen, Advil, Aleve) are the most effective pain relievers for stent-related discomfort. You may safely use these in combination with Tylenol.   You may also take Tylenol for pain control- but not more than 3000 mg daily.  Flomax (Tamsulosin): This is a medication used to relax the ureter. It should help with flank pain associated with the stent. Take 0.4 mg (1 pill) every day.  Oxybutynin (Ditropan): This is a medication used to treat overactive bladder/bladder spasms related to the stent. You may take up to 5 mg three times a day. This medication causes constipation so make sure to take a stool softener along with it.   We recommend over the counter fiber (metamucil or benefiber) and stool softeners (miralax, docusate or senna) to prevent postoperative constipation, but  "stop if you develop diarrhea.    Removing your stent at home:  Some patients will discharge with a stent on a string- taped to the outside of your body.  In 2 days (On Friday, 02/11/2022), remove the tape from your body and pull the string with gentle force until the stent is removed. This is a long, thin, blue plastic tube.   Some patient are more comfortable pulling the stent in the bathtub or shower  You may notice a  tugging  sensation in your flank, but it should not be severely painful  You may notice flank discomfort for the next 24 hours after the stent is removed.   If the tape falls off before your stent removal date, any medical grade tape may be used to re-secure the strings. Just ensure the strings are not on tension.    Follow-Up:  - Follow up with Urology on 03/25/2022  - Call or return sooner than your regularly scheduled visit if you develop any of the following: fever (greater than 101.5), uncontrolled pain, uncontrolled nausea or vomiting, or inability to urinate.    Phone numbers:   - Monday through Friday 8am to 4:30pm: Call [phone] with questions, requests for medication refills, or to schedule or confirm an appointment.  - Nights or weekends: call the after hours emergency pager - [phone] and tell the  \"I would like to page the Urology Resident on call.\" Please note, due to prescribing laws, resident physicians are unable to prescribe narcotics after-hours. If you feel as though you will need a refill of a narcotic pain medication, you will need to call the clinic during business hours OR seek emergency care.  - For emergencies, call 911      "

## 2022-02-09 NOTE — ANESTHESIA CARE TRANSFER NOTE
Patient: Brendan Hunt    Procedure: Procedure(s):  CYSTOURETEROSCOPY, RIGHT RETROGRADE, URETEROSCOPY, LASER STANDBY, RIGHT URETERAL STENT PLACEMENT       Diagnosis: Right nephrolithiasis [N20.0]  Diagnosis Additional Information: No value filed.    Anesthesia Type:   General     Note:    Oropharynx: oropharynx clear of all foreign objects  Level of Consciousness: awake  Oxygen Supplementation: face mask    Independent Airway: airway patency satisfactory and stable  Dentition: dentition unchanged  Vital Signs Stable: post-procedure vital signs reviewed and stable  Report to RN Given: handoff report given  Patient transferred to: PACU    Handoff Report: Identifed the Patient, Identified the Reponsible Provider, Reviewed the pertinent medical history, Discussed the surgical course, Reviewed Intra-OP anesthesia mangement and issues during anesthesia, Set expectations for post-procedure period and Allowed opportunity for questions and acknowledgement of understanding      Vitals:  Vitals Value Taken Time   /81 02/09/22 1059   Temp     Pulse 85 02/09/22 1101   Resp 12 02/09/22 1101   SpO2 100 % 02/09/22 1101   Vitals shown include unvalidated device data.    Electronically Signed By: CLEMENTINA Carcamo CRNA  February 9, 2022  11:01 AM

## 2022-02-09 NOTE — ANESTHESIA PREPROCEDURE EVALUATION
Anesthesia Pre-Procedure Evaluation    Patient: Brendan Hunt   MRN: [id number] : 2000        Preoperative Diagnosis: Right nephrolithiasis [N20.0]    Procedure : Procedure(s):  CYSTOURETEROSCOPY, WITH LITHOTRIPSY USING LASER AND URETERAL STENT INSERTION          Past Medical History:   Diagnosis Date     Headache 2019     Kidney stones      Migraine with aura and without status migrainosus, not intractable 2019     Nephrolithiasis 2019      Past Surgical History:   Procedure Laterality Date     COMBINED CYSTOSCOPY, RETROGRADES, URETEROSCOPY, LASER HOLMIUM LITHOTRIPSY URETER(S), INSERT STENT Right 2022    Procedure: 1. Cystourethroscopy 2. Right ureteroscopy with stone extraction;  Surgeon: Sanna Bartlett MD;  Location: [facility] OR      No Known Allergies   Social History     Tobacco Use     Smoking status: Current Every Day Smoker     Smokeless tobacco: Never Used   Substance Use Topics     Alcohol use: Not on file      Wt Readings from Last 1 Encounters:   22 61.7 kg (136 lb)        Anesthesia Evaluation            ROS/MED HX  ENT/Pulmonary:     (+) tobacco use, Current use,     Neurologic:     (+) migraines,     Cardiovascular:       METS/Exercise Tolerance:     Hematologic:       Musculoskeletal:       GI/Hepatic:       Renal/Genitourinary:     (+) Nephrolithiasis ,     Endo:       Psychiatric/Substance Use:       Infectious Disease:       Malignancy:       Other:            Physical Exam    Airway  airway exam normal      Mallampati: II   TM distance: > 3 FB   Neck ROM: full   Mouth opening: > 3 cm    Respiratory Devices and Support         Dental  no notable dental history         Cardiovascular          Rhythm and rate: regular and normal     Pulmonary   pulmonary exam normal        breath sounds clear to auscultation           OUTSIDE LABS:  CBC:   Lab Results   Component Value Date    WBC 4.2 2022    WBC 4.1 2022    HGB 13.0 (L) 2022    HGB 14.8 2022     HCT 41.4 01/02/2022    HCT 45.2 01/01/2022     01/02/2022     01/01/2022     BMP:   Lab Results   Component Value Date     01/02/2022     01/01/2022    POTASSIUM 4.1 01/02/2022    POTASSIUM 3.8 01/01/2022    CHLORIDE 108 01/02/2022    CHLORIDE 103 01/01/2022    CO2 29 01/02/2022    CO2 32 01/01/2022    BUN 9 01/02/2022    BUN 11 01/01/2022    CR 0.91 01/02/2022    CR 0.90 01/01/2022    GLC 93 01/02/2022     (H) 01/01/2022     COAGS: No results found for: PTT, INR, FIBR  POC: No results found for: BGM, HCG, HCGS  HEPATIC:   Lab Results   Component Value Date    ALBUMIN 4.5 06/05/2019    PROTTOTAL 8.1 06/05/2019    ALT 27 06/05/2019    AST 16 06/05/2019    ALKPHOS 74 06/05/2019    BILITOTAL 0.7 06/05/2019     OTHER:   Lab Results   Component Value Date    PARKER 8.4 (L) 01/02/2022    LIPASE 71 (L) 06/05/2019    CRP <0.1 03/27/2019       Anesthesia Plan    ASA Status:  2   NPO Status:  NPO Appropriate    Anesthesia Type: General.     - Airway: LMA   Induction: Intravenous.   Maintenance: Balanced.        Consents    Anesthesia Plan(s) and associated risks, benefits, and realistic alternatives discussed. Questions answered and patient/representative(s) expressed understanding.    - Discussed:     - Discussed with:  Patient      - Extended Intubation/Ventilatory Support Discussed: No.      - Patient is DNR/DNI Status: No    Use of blood products discussed: No .     Postoperative Care    Pain management: IV analgesics, Multi-modal analgesia.   PONV prophylaxis: Ondansetron (or other 5HT-3), Background Propofol Infusion     Comments:                Jose Dill MD

## 2022-02-09 NOTE — OP NOTE
OPERATIVE REPORT    PREOPERATIVE DIAGNOSIS:  right-sided renal stone    POSTOPERATIVE DIAGNOSIS:  Same    PROCEDURES PERFORMED:   1. Cystourethroscopy  2. Right ureteroscopy  3. Right retrograde pyelogram with interpretation of intraoperative fluoroscopic imaging  4. Right ureteral stent placement    STAFF SURGEON: Curtis Alfaro MD  RESIDENT(S): Christopher Marie MD  ANESTHESIA: General    ESTIMATED BLOOD LOSS: 1 cc  DRAINS/TUBES: 6 Panamanian x 26cm double-J ureteral stent    IV FLUIDS: Please see dictated anesthesia record  COMPLICATIONS: None.   SPECIMEN: None  SIGNIFICANT FINDINGS:   1. Stone located in diverticula, some small stone debris in middle pole. No stones larger than 1mm which may obstruct ureter    BRIEF OPERATIVE INDICATIONS: Brendan Hunt is a(n) 22 year old male who presented with an obstructing right 3mm distal ureteral stone and nonobstructing right renal stones. He passed this stone by the time he was taken back for primary ureteroscopy, as it was visualized in the bladder and semirigid ureteroscopy demonstrated no stone. However, he is concerned about his renal stone and therefore would like to proceed with primary ureteroscopy for treatment of these.  After a discussion of all risks, benefits, and alternatives, the patient elected to proceed with definitive stone management. The patient understands the potential need for more than one procedure to eliminate all stone burden.     A 22-Panamanian rigid cystoscope was inserted into a well-lubricated urethra. The urethra was unremarkable without stricture. The ureteral orifice was identified and cannulated with a sensor wire with the aid of a 5 Panamanian open-ended catheter. The wire passed without resistance into the upper pole.     Flexible URS  A second sensor wire was passed over the flexible ureteroscope to aid in cannulating the right UO. Once in the distal ureter, the wires were removed and the ureteroscope driven to the renal pelvis. A  retrograde pyelogram was performed to serve as a roadmap, which demonstrated severe hydronephrosis. The stone appreciated on CT was visualized on the  film taken via C-arm. In correlation with ureteroscopic view, this was found to be located in a calycel diverticulum, the opening of which was ~6 Chinese. We passed a wire into this diverticula to confirm that this indeed was the location of the stone, and the wire was seen wrapping around the stone on X-ray. We considered opening the diverticula to perform lithotripsy, but given he was asymptomatic from these stones, the risks involved, and that these would not pass into the ureter given the ~6 Chinese opening they would need to traverse, we elected to not perform lithotripsy.    We then performed systemic pyeloscopy. There was some stone debris and matrix material in one of the middle calyces. We did attempt to obtain some of the stone material using the Halo basket, but the material was too small to grasp. There was some mineralization of the calyces appreciated. Pullback ureteroscopy was performed and showed no retained stone fragments or significant ureteral injury. We did leave a stent in place.    A 6Fr x 26-cm double-J stent was advanced over the Sensor wire, and a good proximal curl was seen in the renal pelvis on fluoroscopy and the distal curl was seen in the bladder. The bladder was drained.    The patient tolerated the procedure well and there were no apparent complications. The patient  was transported to the postanesthesia care unit in stable condition.     POSTOP PLAN:  -Discharge  -Pt to remove stent at home on Friday, 02/11/2022  -Follow up with Urology as scheduled    Christopher Marie MD  Urology Resident    I, Curtis Alfaro, was present and participatory for the entirety of the procedure

## 2022-02-09 NOTE — H&P
Chief Complaint:   Right Renal Stone         History of Present Illness:    Brendan Hunt is a very pleasant 22 year old male who presents with a history of right ureteral stone, recently had right ureteroscopy and noted to have passed the stone.  On outpatient workup noted to have 1 cm stone still in kidney.  Desires removal.           Past Medical History:     Past Medical History:   Diagnosis Date     Headache 12/16/2019     Kidney stones      Migraine with aura and without status migrainosus, not intractable 12/16/2019     Nephrolithiasis 12/16/2019            Past Surgical History:     Past Surgical History:   Procedure Laterality Date     COMBINED CYSTOSCOPY, RETROGRADES, URETEROSCOPY, LASER HOLMIUM LITHOTRIPSY URETER(S), INSERT STENT Right 1/2/2022    Procedure: 1. Cystourethroscopy 2. Right ureteroscopy with stone extraction;  Surgeon: Sanna Bartlett MD;  Location:  OR            Medications     Current Outpatient Medications   Medication     diclofenac (CATAFLAM) 50 MG tablet     Current Facility-Administered Medications   Medication     acetaminophen (TYLENOL) tablet 975 mg     ceFAZolin (ANCEF) intermittent infusion 2 g in 50 mL dextrose PREMIX     ceFAZolin (ANCEF) intermittent infusion 2 g in 50 mL dextrose PREMIX     gabapentin (NEURONTIN) capsule 300 mg     lactated ringers infusion     lidocaine (LMX4) kit     lidocaine 1 % 0.1-1 mL     sodium chloride (PF) 0.9% PF flush 3 mL     sodium chloride (PF) 0.9% PF flush 3 mL            Family History:   History reviewed. No pertinent family history.         Social History:     Social History     Socioeconomic History     Marital status: Single     Spouse name: Not on file     Number of children: Not on file     Years of education: Not on file     Highest education level: Not on file   Occupational History     Not on file   Tobacco Use     Smoking status: Current Every Day Smoker     Smokeless tobacco: Never Used   Substance and Sexual Activity  "    Alcohol use: Not on file     Drug use: Not on file     Sexual activity: Not on file   Other Topics Concern     Not on file   Social History Narrative     Not on file     Social Determinants of Health     Financial Resource Strain: Not on file   Food Insecurity: Not on file   Transportation Needs: Not on file   Physical Activity: Not on file   Stress: Not on file   Social Connections: Not on file   Intimate Partner Violence: Not on file   Housing Stability: Not on file            Allergies:   Patient has no known allergies.         Review of Systems:  From intake questionnaire   Negative 14 system review except as noted on HPI, nurse's note.         Physical Exam:   Patient is a 22 year old  male   Vitals: Blood pressure (!) 125/90, pulse 81, temperature 98.1  F (36.7  C), temperature source Temporal, resp. rate 18, height 1.778 m (5' 10\"), weight 61.7 kg (136 lb), SpO2 100 %.  General Appearance Adult: Alert, no acute distress, oriented  HENT: throat/mouth:normal, good dentition  Neck: No adenopathy,masses or thyromegaly  Lungs: no respiratory distress, or pursed lip breathing  Heart: No obvious jugular venous distension present  Abdomen: soft, nontender, no organomegaly or masses, Body mass index is 19.51 kg/m .  Lymphatics: No cervical or supraclavicular adenopathy  Musculoskeltal: extremities normal, no peripheral edema  Skin: no suspicious lesions or rashes  Neuro: Alert, oriented, speech and mentation normal  Psych: affect and mood normal  Gait: Normal        Labs and Pathology:    I personally reviewed all applicable laboratory data and went over findings with patient  Significant for:    CBC RESULTS:  Recent Labs   Lab Test 01/02/22  0729 01/01/22 1922 06/05/19 2130 03/27/19 2027   WBC 4.2 4.1 7.1 4.3   HGB 13.0* 14.8 14.7 13.7*    254 254 262        BMP RESULTS:  Recent Labs   Lab Test 01/02/22  0729 01/01/22 1922 06/05/19 2130 03/27/19 2027    138 139 140   POTASSIUM 4.1 3.8 3.3* 3.9 "   CHLORIDE 108 103 102 102   CO2 29 32 32 27   ANIONGAP 3 3 4 11   GLC 93 104* 116* 95   BUN 9 11 10 9   CR 0.91 0.90 0.91 0.97   GFRESTIMATED >90 >90 >90 >60   GFRESTBLACK  --   --  >90 >60   PARKER 8.4* 9.1 8.8 9.2       UA RESULTS:   Recent Labs   Lab Test 01/01/22  1922 02/12/20  1120 06/05/19  2344 03/27/19 2018   SG 1.018 1.025 1.014 1.015   URINEPH 7.5* 7.0 7.0 6.0   NITRITE Negative Negative Negative Negative   RBCU 7*  --  6* 0-2   WBCU <1  --  1 0-5       CALCIUM RESULTS  Lab Results   Component Value Date    PARKER 8.4 01/02/2022    PARKER 9.1 01/01/2022    PARKER 8.8 06/05/2019    PARKER 9.2 03/27/2019       PSA RESULTS  No results found for: PSA    INR  No results for input(s): INR in the last 60399 hours.        Imaging:    I personally reviewed all applicable imaging and went over the below findings with patient.    Results for orders placed or performed during the hospital encounter of 01/01/22   CT Abdomen Pelvis w/o Contrast    Narrative    EXAM: CT ABDOMEN PELVIS W/O CONTRAST  LOCATION: [facility]  DATE/TIME: 1/1/2022 7:42 PM    INDICATION: Flank pain, kidney stone suspected. Right-sided flank pain.  COMPARISON: None.  TECHNIQUE: CT scan of the abdomen and pelvis was performed without IV contrast. Multiplanar reformats were obtained. Dose reduction techniques were used.  CONTRAST: None.    FINDINGS:   LOWER CHEST: Normal.    HEPATOBILIARY: Normal.    PANCREAS: Normal.    SPLEEN: Normal.    ADRENAL GLANDS: Normal.    KIDNEYS/BLADDER: Right ureterovesicular junction 3 mm obstructing calculus with severe right hydroureteronephrosis and perinephric stranding. Cluster of nonobstructing right renal calculi measuring up to 9 mm. No left renal calculi or hydronephrosis.    BOWEL: No obstruction or inflammatory change.    LYMPH NODES: No lymphadenopathy.    PELVIC ORGANS: Normal.    MUSCULOSKELETAL: Normal.      Impression    IMPRESSION:   1.  Right ureterovesicular junction 3 mm obstructing  calculus with severe right hydroureteronephrosis.  2.  Additional small nonobstructing right renal calculi.     XR Surgery [facility] L/T 5 Min Fluoro w Stills    Narrative    This exam was marked as non-reportable because it will not be read by a   radiologist or a [facility] non-radiologist provider.                    Assessment and Plan:     Assessment:22 year old male with larrge right renal stone    Plan:  -To OR for right ureteroscopy, laser lithotripsy, stent    We discussed the risks of bleeding, infection, incomplete stone removal, damage to adjacent organs, and need for staged procedures.        I, Curtis Alfaro saw and evaluated this patient and agree with the plan as stated above.  I personally performed all listed procedures.

## 2022-02-12 ENCOUNTER — HOSPITAL ENCOUNTER (EMERGENCY)
Facility: CLINIC | Age: 22
Discharge: HOME | End: 2022-02-12
Attending: EMERGENCY MEDICINE | Admitting: EMERGENCY MEDICINE
Payer: COMMERCIAL

## 2022-02-12 ENCOUNTER — APPOINTMENT (OUTPATIENT)
Dept: GENERAL RADIOLOGY | Facility: CLINIC | Age: 22
End: 2022-02-12
Attending: EMERGENCY MEDICINE
Payer: COMMERCIAL

## 2022-02-12 VITALS
BODY MASS INDEX: 21.86 KG/M2 | HEART RATE: 73 BPM | OXYGEN SATURATION: 100 % | DIASTOLIC BLOOD PRESSURE: 75 MMHG | HEIGHT: 66 IN | RESPIRATION RATE: 18 BRPM | SYSTOLIC BLOOD PRESSURE: 130 MMHG | TEMPERATURE: 99.1 F | WEIGHT: 136 LBS

## 2022-02-12 DIAGNOSIS — N20.0 KIDNEY STONE: ICD-10-CM

## 2022-02-12 DIAGNOSIS — R10.9 FLANK PAIN: ICD-10-CM

## 2022-02-12 LAB
ALBUMIN UR-MCNC: 200 MG/DL
APPEARANCE UR: ABNORMAL
BILIRUB UR QL STRIP: NEGATIVE
COLOR UR AUTO: ABNORMAL
GLUCOSE UR STRIP-MCNC: NEGATIVE MG/DL
HGB UR QL STRIP: ABNORMAL
KETONES UR STRIP-MCNC: NEGATIVE MG/DL
LEUKOCYTE ESTERASE UR QL STRIP: ABNORMAL
NITRATE UR QL: NEGATIVE
PH UR STRIP: 6.5 [PH] (ref 5–7)
RBC URINE: >182 /HPF
SP GR UR STRIP: 1.01 (ref 1–1.03)
SQUAMOUS EPITHELIAL: 4 /HPF
UROBILINOGEN UR STRIP-MCNC: NORMAL MG/DL
WBC URINE: >182 /HPF

## 2022-02-12 PROCEDURE — 99284 EMERGENCY DEPT VISIT MOD MDM: CPT | Mod: 25

## 2022-02-12 PROCEDURE — 74019 RADEX ABDOMEN 2 VIEWS: CPT

## 2022-02-12 PROCEDURE — 250N000011 HC RX IP 250 OP 636: Performed by: EMERGENCY MEDICINE

## 2022-02-12 PROCEDURE — 81001 URINALYSIS AUTO W/SCOPE: CPT | Performed by: EMERGENCY MEDICINE

## 2022-02-12 PROCEDURE — 250N000013 HC RX MED GY IP 250 OP 250 PS 637: Performed by: EMERGENCY MEDICINE

## 2022-02-12 PROCEDURE — 96372 THER/PROPH/DIAG INJ SC/IM: CPT | Performed by: EMERGENCY MEDICINE

## 2022-02-12 PROCEDURE — 51798 US URINE CAPACITY MEASURE: CPT

## 2022-02-12 PROCEDURE — 87086 URINE CULTURE/COLONY COUNT: CPT | Performed by: EMERGENCY MEDICINE

## 2022-02-12 RX ORDER — OXYCODONE AND ACETAMINOPHEN 5; 325 MG/1; MG/1
1 TABLET ORAL ONCE
Status: COMPLETED | OUTPATIENT
Start: 2022-02-12 | End: 2022-02-12

## 2022-02-12 RX ORDER — KETOROLAC TROMETHAMINE 30 MG/ML
30 INJECTION, SOLUTION INTRAMUSCULAR; INTRAVENOUS ONCE
Status: COMPLETED | OUTPATIENT
Start: 2022-02-12 | End: 2022-02-12

## 2022-02-12 RX ORDER — OXYBUTYNIN CHLORIDE 10 MG/1
10 TABLET, EXTENDED RELEASE ORAL DAILY
Qty: 10 TABLET | Refills: 0 | Status: ON HOLD | OUTPATIENT
Start: 2022-02-12 | End: 2022-02-17

## 2022-02-12 RX ORDER — HYDROCODONE BITARTRATE AND ACETAMINOPHEN 5; 325 MG/1; MG/1
1 TABLET ORAL EVERY 6 HOURS PRN
Qty: 6 TABLET | Refills: 0 | Status: SHIPPED | OUTPATIENT
Start: 2022-02-12

## 2022-02-12 RX ADMIN — OXYCODONE HYDROCHLORIDE AND ACETAMINOPHEN 1 TABLET: 5; 325 TABLET ORAL at 19:43

## 2022-02-12 RX ADMIN — KETOROLAC TROMETHAMINE 30 MG: 30 INJECTION, SOLUTION INTRAMUSCULAR; INTRAVENOUS at 20:44

## 2022-02-12 ASSESSMENT — MIFFLIN-ST. JEOR: SCORE: 1549.64

## 2022-02-13 NOTE — ED TRIAGE NOTES
Pt presents for evaluation of right flank pain. Had a stent placed last week for a kidney stone. Here for pain control. Is out of pain meds. Took tylenol this afternoon. Notes blood in urine.

## 2022-02-13 NOTE — ED PROVIDER NOTES
History     Chief Complaint:    Flank Pain      HPI  Brendan Hunt is a 22 year old male who presents with right flank pain status post a kidney stone with stenting on the right performed by Dr. Alfaro on the ninth.  The patient said originally he was passing clots said he has had increased pain and difficulty urinating.  He said after the procedure he passed a large clot has had intermittent bloody urine and right flank pain.  The patient said he is out of pain medication and due to the pain presented to the ER he has had no fevers has had normal bowel and bladder function no trauma has not been lightheaded or passing out.  Patient feels like he cannot urinate    Review of Systems  10 point review of systems all negative except HPI    Allergies:    No Known Allergies      Medications:      HYDROcodone-acetaminophen (NORCO) 5-325 MG tablet  oxybutynin ER (DITROPAN-XL) 10 MG 24 hr tablet  diclofenac (CATAFLAM) 50 MG tablet  oxybutynin (DITROPAN) 5 MG tablet  tamsulosin (FLOMAX) 0.4 MG capsule        Past Medical History:      Past Medical History:   Diagnosis Date     Headache 12/16/2019     Kidney stones      Migraine with aura and without status migrainosus, not intractable 12/16/2019     Nephrolithiasis 12/16/2019     Patient Active Problem List    Diagnosis Date Noted     Kidney stone 01/01/2022     Priority: Medium     Nephrolithiasis 12/16/2019     Priority: Medium     Migraine with aura and without status migrainosus, not intractable 12/16/2019     Priority: Medium        Past Surgical History:      Past Surgical History:   Procedure Laterality Date     COMBINED CYSTOSCOPY, RETROGRADES, URETEROSCOPY, LASER HOLMIUM LITHOTRIPSY URETER(S), INSERT STENT Right 1/2/2022    Procedure: 1. Cystourethroscopy 2. Right ureteroscopy with stone extraction;  Surgeon: Sanna Bartlett MD;  Location: [facility] OR     LASER HOLMIUM LITHOTRIPSY URETER(S), INSERT STENT, COMBINED Right 2/9/2022    Procedure: CYSTOURETEROSCOPY,  "RIGHT RETROGRADE, URETEROSCOPY, LASER STANDBY, RIGHT URETERAL STENT PLACEMENT;  Surgeon: Curtis Alfaro MD;  Location: [facility] OR       Family History:    No known bleeding disorders    Social History:  Patient is in ER by himself    Physical Exam     Patient Vitals for the past 24 hrs:   BP Temp Temp src Pulse Resp SpO2 Height Weight   02/12/22 2115 130/75 -- -- 73 18 100 % -- --   02/12/22 2045 130/80 99.1  F (37.3  C) Oral 75 20 100 % -- --   02/12/22 1914 130/75 -- Oral 76 22 100 % -- --   02/12/22 1907 123/72 98  F (36.7  C) Oral 81 18 100 % 1.676 m (5' 6\") 61.7 kg (136 lb)       Physical Exam  General: The patient is alert, in no respiratory distress.    HENT: Mucous membranes moist.    Cardiovascular: Regular rate and rhythm. Good pulses     Respiratory: Lungs are clear. No nasal flaring. No retractions. No wheezing, no crackles.    Gastrointestinal: Abdomen soft. No guarding, no rebound.    Musculoskeletal: No gross deformity.     Skin: No rashes or petechiae.     Genitourinary: Circumcised male no blood at the meatus no bruising.  There is a Steri-Strip on his penis.    Neurologic: The patient is alert and able answer questions.  He follows commands no gross deficit.    Lymphatic: No cervical adenopathy. No lower extremity swelling.    Psychiatric: The patient is non-tearful.    Emergency Department Course     Imaging:  KUB XR   Final Result   IMPRESSION: A right double-J ureteral stent is in place. A 0.8 cm stone or cluster of stones is noted in the upper pole of the right kidney. No other convincing urinary calculi are seen. Moderate amount of stool throughout the colon.          Laboratory:  Labs Ordered and Resulted from Time of ED Arrival to Time of ED Departure   ROUTINE UA WITH MICROSCOPIC - Abnormal       Result Value    Color Urine Dark Brown (*)     Appearance Urine Cloudy (*)     Glucose Urine Negative      Bilirubin Urine Negative      Ketones Urine Negative      Specific Gravity Urine 1.013  "     Blood Urine Large (*)     pH Urine 6.5      Protein Albumin Urine 200  (*)     Urobilinogen Urine Normal      Nitrite Urine Negative      Leukocyte Esterase Urine Moderate (*)     RBC Urine >182 (*)     WBC Urine >182 (*)     Squamous Epithelials Urine 4 (*)        Procedures:      Emergency Department Course:           Reviewed:    I reviewed nursing notes, vitals and past history    Assessments:   I obtained history and examined the patient as noted above.    I rechecked the patient and explained findings.       Consults:   Urology partner Dr. Juarez on call.  Recommends oxybutynin and follow-up    Interventions:    Medications   oxyCODONE-acetaminophen (PERCOCET) 5-325 MG per tablet 1 tablet (1 tablet Oral Given 2/12/22 1943)   ketorolac (TORADOL) injection 30 mg (30 mg Intramuscular Given 2/12/22 2044)       Disposition:  The patient was discharged to home.    Impression & Plan        Medical Decision Making:  Patient has had a kidney stone discharged on Flomax.  He recently has had stenting.  I reviewed that note it sounds like there was some stone fragments found but there was a question of whether he already passed the stone.  The fact that he is having some clots in his urine and difficulty urinating mainly concerned about the potential for an obstruction from a clot.  His bladder was scanned and was 293 with a postvoid residual of 44.  His urine did not show any gross clots.  I ordered an x-ray to look for migration of the stent.  The x-ray does show a large burden of stones.  I did consult urology who did not feel any further intervention is required.  The patient's pain was under control he was to treated with Toradol.  He otherwise is stable and was discharged to use oxybutynin and pain medication.  I think likely he is having hematuria leading to the difficulty with urinating there are no current clots.  The large stone is likely the cause of his pain and he was discharged outpatient follow-up with  urology.  Symptoms to return for discussed.    Covid-19  Brendan Hunt was evaluated during a global COVID-19 pandemic, which necessitated consideration that the patient might be at risk for infection with the SARS-CoV-2 virus that causes COVID-19.   Applicable protocols for evaluation were followed during the patient's care.       Diagnosis:    ICD-10-CM    1. Flank pain  R10.9    2. Kidney stone  N20.0        Discharge Medications:  Discharge Medication List as of 2/12/2022  9:16 PM      START taking these medications    Details   HYDROcodone-acetaminophen (NORCO) 5-325 MG tablet Take 1 tablet by mouth every 6 hours as needed for pain, Disp-6 tablet, R-0, Local Print      oxybutynin ER (DITROPAN-XL) 10 MG 24 hr tablet Take 1 tablet (10 mg) by mouth daily for 10 days, Disp-10 tablet, R-0, Local Print                    Uday Cadet MD  02/13/22 0032

## 2022-02-14 ENCOUNTER — APPOINTMENT (OUTPATIENT)
Dept: GENERAL RADIOLOGY | Facility: CLINIC | Age: 22
End: 2022-02-14
Attending: INTERNAL MEDICINE
Payer: COMMERCIAL

## 2022-02-14 ENCOUNTER — APPOINTMENT (OUTPATIENT)
Dept: ULTRASOUND IMAGING | Facility: CLINIC | Age: 22
End: 2022-02-14
Attending: INTERNAL MEDICINE
Payer: COMMERCIAL

## 2022-02-14 ENCOUNTER — HOSPITAL ENCOUNTER (OUTPATIENT)
Facility: CLINIC | Age: 22
Setting detail: OBSERVATION
Discharge: HOME | End: 2022-02-17
Attending: INTERNAL MEDICINE | Admitting: INTERNAL MEDICINE
Payer: COMMERCIAL

## 2022-02-14 DIAGNOSIS — N13.30 HYDRONEPHROSIS, UNSPECIFIED HYDRONEPHROSIS TYPE: ICD-10-CM

## 2022-02-14 LAB
ALBUMIN UR-MCNC: 100 MG/DL
AMORPH CRY #/AREA URNS HPF: ABNORMAL /HPF
ANION GAP SERPL CALCULATED.3IONS-SCNC: 4 MMOL/L (ref 3–14)
APPEARANCE UR: ABNORMAL
BACTERIA UR CULT: NO GROWTH
BASOPHILS # BLD AUTO: 0.1 10E3/UL (ref 0–0.2)
BASOPHILS NFR BLD AUTO: 1 %
BILIRUB UR QL STRIP: NEGATIVE
BUN SERPL-MCNC: 10 MG/DL (ref 7–30)
CALCIUM SERPL-MCNC: 8.8 MG/DL (ref 8.5–10.1)
CHLORIDE BLD-SCNC: 105 MMOL/L (ref 94–109)
CO2 SERPL-SCNC: 29 MMOL/L (ref 20–32)
COLOR UR AUTO: ABNORMAL
CREAT SERPL-MCNC: 0.8 MG/DL (ref 0.66–1.25)
EOSINOPHIL # BLD AUTO: 0.1 10E3/UL (ref 0–0.7)
EOSINOPHIL NFR BLD AUTO: 2 %
ERYTHROCYTE [DISTWIDTH] IN BLOOD BY AUTOMATED COUNT: 11.4 % (ref 10–15)
GFR SERPL CREATININE-BSD FRML MDRD: >90 ML/MIN/1.73M2
GLUCOSE BLD-MCNC: 97 MG/DL (ref 70–99)
GLUCOSE UR STRIP-MCNC: NEGATIVE MG/DL
HCT VFR BLD AUTO: 39.3 % (ref 40–53)
HGB BLD-MCNC: 13.2 G/DL (ref 13.3–17.7)
HGB UR QL STRIP: ABNORMAL
IMM GRANULOCYTES # BLD: 0 10E3/UL
IMM GRANULOCYTES NFR BLD: 0 %
KETONES UR STRIP-MCNC: NEGATIVE MG/DL
LEUKOCYTE ESTERASE UR QL STRIP: ABNORMAL
LYMPHOCYTES # BLD AUTO: 1.6 10E3/UL (ref 0.8–5.3)
LYMPHOCYTES NFR BLD AUTO: 25 %
MCH RBC QN AUTO: 29.1 PG (ref 26.5–33)
MCHC RBC AUTO-ENTMCNC: 33.6 G/DL (ref 31.5–36.5)
MCV RBC AUTO: 87 FL (ref 78–100)
MONOCYTES # BLD AUTO: 0.5 10E3/UL (ref 0–1.3)
MONOCYTES NFR BLD AUTO: 9 %
MUCOUS THREADS #/AREA URNS LPF: PRESENT /LPF
NEUTROPHILS # BLD AUTO: 4 10E3/UL (ref 1.6–8.3)
NEUTROPHILS NFR BLD AUTO: 63 %
NITRATE UR QL: NEGATIVE
NRBC # BLD AUTO: 0 10E3/UL
NRBC BLD AUTO-RTO: 0 /100
PH UR STRIP: 7.5 [PH] (ref 5–7)
PLATELET # BLD AUTO: 266 10E3/UL (ref 150–450)
POTASSIUM BLD-SCNC: 4.4 MMOL/L (ref 3.4–5.3)
RBC # BLD AUTO: 4.53 10E6/UL (ref 4.4–5.9)
RBC URINE: >182 /HPF
SARS-COV-2 RNA RESP QL NAA+PROBE: NEGATIVE
SODIUM SERPL-SCNC: 138 MMOL/L (ref 133–144)
SP GR UR STRIP: 1.01 (ref 1–1.03)
UROBILINOGEN UR STRIP-MCNC: NORMAL MG/DL
WBC # BLD AUTO: 6.3 10E3/UL (ref 4–11)
WBC URINE: 40 /HPF

## 2022-02-14 PROCEDURE — 36415 COLL VENOUS BLD VENIPUNCTURE: CPT | Performed by: INTERNAL MEDICINE

## 2022-02-14 PROCEDURE — 81001 URINALYSIS AUTO W/SCOPE: CPT | Performed by: INTERNAL MEDICINE

## 2022-02-14 PROCEDURE — G0378 HOSPITAL OBSERVATION PER HR: HCPCS

## 2022-02-14 PROCEDURE — 81003 URINALYSIS AUTO W/O SCOPE: CPT | Performed by: EMERGENCY MEDICINE

## 2022-02-14 PROCEDURE — 250N000011 HC RX IP 250 OP 636: Performed by: PHYSICIAN ASSISTANT

## 2022-02-14 PROCEDURE — 96374 THER/PROPH/DIAG INJ IV PUSH: CPT

## 2022-02-14 PROCEDURE — 85025 COMPLETE CBC W/AUTO DIFF WBC: CPT | Performed by: INTERNAL MEDICINE

## 2022-02-14 PROCEDURE — 250N000011 HC RX IP 250 OP 636: Performed by: EMERGENCY MEDICINE

## 2022-02-14 PROCEDURE — 99285 EMERGENCY DEPT VISIT HI MDM: CPT | Mod: 25

## 2022-02-14 PROCEDURE — 96376 TX/PRO/DX INJ SAME DRUG ADON: CPT

## 2022-02-14 PROCEDURE — 96361 HYDRATE IV INFUSION ADD-ON: CPT

## 2022-02-14 PROCEDURE — 258N000003 HC RX IP 258 OP 636: Performed by: PHYSICIAN ASSISTANT

## 2022-02-14 PROCEDURE — 250N000013 HC RX MED GY IP 250 OP 250 PS 637: Performed by: PHYSICIAN ASSISTANT

## 2022-02-14 PROCEDURE — 74018 RADEX ABDOMEN 1 VIEW: CPT

## 2022-02-14 PROCEDURE — 99220 PR INITIAL OBSERVATION CARE,LEVEL III: CPT | Performed by: PHYSICIAN ASSISTANT

## 2022-02-14 PROCEDURE — 82310 ASSAY OF CALCIUM: CPT | Performed by: INTERNAL MEDICINE

## 2022-02-14 PROCEDURE — C9803 HOPD COVID-19 SPEC COLLECT: HCPCS

## 2022-02-14 PROCEDURE — 87635 SARS-COV-2 COVID-19 AMP PRB: CPT | Performed by: INTERNAL MEDICINE

## 2022-02-14 PROCEDURE — 250N000011 HC RX IP 250 OP 636: Performed by: INTERNAL MEDICINE

## 2022-02-14 PROCEDURE — 76770 US EXAM ABDO BACK WALL COMP: CPT

## 2022-02-14 PROCEDURE — 96375 TX/PRO/DX INJ NEW DRUG ADDON: CPT

## 2022-02-14 PROCEDURE — 87086 URINE CULTURE/COLONY COUNT: CPT | Performed by: INTERNAL MEDICINE

## 2022-02-14 RX ORDER — KETOROLAC TROMETHAMINE 15 MG/ML
15 INJECTION, SOLUTION INTRAMUSCULAR; INTRAVENOUS ONCE
Status: COMPLETED | OUTPATIENT
Start: 2022-02-14 | End: 2022-02-14

## 2022-02-14 RX ORDER — SODIUM CHLORIDE, SODIUM LACTATE, POTASSIUM CHLORIDE, CALCIUM CHLORIDE 600; 310; 30; 20 MG/100ML; MG/100ML; MG/100ML; MG/100ML
INJECTION, SOLUTION INTRAVENOUS CONTINUOUS
Status: DISCONTINUED | OUTPATIENT
Start: 2022-02-14 | End: 2022-02-16

## 2022-02-14 RX ORDER — NALOXONE HYDROCHLORIDE 0.4 MG/ML
0.4 INJECTION, SOLUTION INTRAMUSCULAR; INTRAVENOUS; SUBCUTANEOUS
Status: DISCONTINUED | OUTPATIENT
Start: 2022-02-14 | End: 2022-02-17 | Stop reason: HOSPADM

## 2022-02-14 RX ORDER — ONDANSETRON 4 MG/1
4 TABLET, ORALLY DISINTEGRATING ORAL EVERY 6 HOURS PRN
Status: DISCONTINUED | OUTPATIENT
Start: 2022-02-14 | End: 2022-02-17 | Stop reason: HOSPADM

## 2022-02-14 RX ORDER — ONDANSETRON 2 MG/ML
4 INJECTION INTRAMUSCULAR; INTRAVENOUS ONCE
Status: COMPLETED | OUTPATIENT
Start: 2022-02-14 | End: 2022-02-14

## 2022-02-14 RX ORDER — AMOXICILLIN 250 MG
2 CAPSULE ORAL 2 TIMES DAILY PRN
Status: DISCONTINUED | OUTPATIENT
Start: 2022-02-14 | End: 2022-02-17 | Stop reason: HOSPADM

## 2022-02-14 RX ORDER — PROCHLORPERAZINE MALEATE 5 MG
10 TABLET ORAL EVERY 6 HOURS PRN
Status: DISCONTINUED | OUTPATIENT
Start: 2022-02-14 | End: 2022-02-17 | Stop reason: HOSPADM

## 2022-02-14 RX ORDER — NALOXONE HYDROCHLORIDE 0.4 MG/ML
0.2 INJECTION, SOLUTION INTRAMUSCULAR; INTRAVENOUS; SUBCUTANEOUS
Status: DISCONTINUED | OUTPATIENT
Start: 2022-02-14 | End: 2022-02-17 | Stop reason: HOSPADM

## 2022-02-14 RX ORDER — TAMSULOSIN HYDROCHLORIDE 0.4 MG/1
0.4 CAPSULE ORAL DAILY
Status: DISCONTINUED | OUTPATIENT
Start: 2022-02-15 | End: 2022-02-17 | Stop reason: HOSPADM

## 2022-02-14 RX ORDER — HYDROMORPHONE HYDROCHLORIDE 1 MG/ML
0.5 INJECTION, SOLUTION INTRAMUSCULAR; INTRAVENOUS; SUBCUTANEOUS ONCE
Status: COMPLETED | OUTPATIENT
Start: 2022-02-14 | End: 2022-02-14

## 2022-02-14 RX ORDER — ONDANSETRON 2 MG/ML
4 INJECTION INTRAMUSCULAR; INTRAVENOUS EVERY 6 HOURS PRN
Status: DISCONTINUED | OUTPATIENT
Start: 2022-02-14 | End: 2022-02-17 | Stop reason: HOSPADM

## 2022-02-14 RX ORDER — HYDROMORPHONE HCL IN WATER/PF 6 MG/30 ML
0.2 PATIENT CONTROLLED ANALGESIA SYRINGE INTRAVENOUS
Status: DISCONTINUED | OUTPATIENT
Start: 2022-02-14 | End: 2022-02-17 | Stop reason: HOSPADM

## 2022-02-14 RX ORDER — ACETAMINOPHEN 325 MG/1
975 TABLET ORAL EVERY 8 HOURS
Status: DISCONTINUED | OUTPATIENT
Start: 2022-02-14 | End: 2022-02-17 | Stop reason: HOSPADM

## 2022-02-14 RX ORDER — PROCHLORPERAZINE 25 MG
25 SUPPOSITORY, RECTAL RECTAL EVERY 12 HOURS PRN
Status: DISCONTINUED | OUTPATIENT
Start: 2022-02-14 | End: 2022-02-17 | Stop reason: HOSPADM

## 2022-02-14 RX ORDER — OXYCODONE HYDROCHLORIDE 5 MG/1
5 TABLET ORAL EVERY 4 HOURS PRN
Status: DISCONTINUED | OUTPATIENT
Start: 2022-02-14 | End: 2022-02-17 | Stop reason: HOSPADM

## 2022-02-14 RX ORDER — HYDROMORPHONE HYDROCHLORIDE 1 MG/ML
0.5 INJECTION, SOLUTION INTRAMUSCULAR; INTRAVENOUS; SUBCUTANEOUS
Status: DISCONTINUED | OUTPATIENT
Start: 2022-02-14 | End: 2022-02-14

## 2022-02-14 RX ORDER — AMOXICILLIN 250 MG
1 CAPSULE ORAL 2 TIMES DAILY PRN
Status: DISCONTINUED | OUTPATIENT
Start: 2022-02-14 | End: 2022-02-17 | Stop reason: HOSPADM

## 2022-02-14 RX ORDER — LIDOCAINE 40 MG/G
CREAM TOPICAL
Status: DISCONTINUED | OUTPATIENT
Start: 2022-02-14 | End: 2022-02-17 | Stop reason: HOSPADM

## 2022-02-14 RX ADMIN — ONDANSETRON 4 MG: 2 INJECTION INTRAMUSCULAR; INTRAVENOUS at 15:41

## 2022-02-14 RX ADMIN — ACETAMINOPHEN 975 MG: 325 TABLET, FILM COATED ORAL at 21:19

## 2022-02-14 RX ADMIN — PROCHLORPERAZINE MALEATE 10 MG: 5 TABLET ORAL at 23:18

## 2022-02-14 RX ADMIN — HYDROMORPHONE HYDROCHLORIDE 0.5 MG: 1 INJECTION, SOLUTION INTRAMUSCULAR; INTRAVENOUS; SUBCUTANEOUS at 15:41

## 2022-02-14 RX ADMIN — HYDROMORPHONE HYDROCHLORIDE 0.5 MG: 1 INJECTION, SOLUTION INTRAMUSCULAR; INTRAVENOUS; SUBCUTANEOUS at 17:30

## 2022-02-14 RX ADMIN — HYDROMORPHONE HYDROCHLORIDE 0.5 MG: 1 INJECTION, SOLUTION INTRAMUSCULAR; INTRAVENOUS; SUBCUTANEOUS at 16:16

## 2022-02-14 RX ADMIN — OXYCODONE HYDROCHLORIDE 5 MG: 5 TABLET ORAL at 21:19

## 2022-02-14 RX ADMIN — ONDANSETRON 4 MG: 4 TABLET, ORALLY DISINTEGRATING ORAL at 21:20

## 2022-02-14 RX ADMIN — KETOROLAC TROMETHAMINE 15 MG: 15 INJECTION, SOLUTION INTRAMUSCULAR; INTRAVENOUS at 15:40

## 2022-02-14 RX ADMIN — SODIUM CHLORIDE, POTASSIUM CHLORIDE, SODIUM LACTATE AND CALCIUM CHLORIDE: 600; 310; 30; 20 INJECTION, SOLUTION INTRAVENOUS at 21:21

## 2022-02-14 ASSESSMENT — ENCOUNTER SYMPTOMS
FLANK PAIN: 1
HEMATURIA: 1

## 2022-02-14 NOTE — ED PROVIDER NOTES
History   Chief Complaint:  Flank Pain, Hematuria, and Post-op Problem       HPI   Brendan Hunt is a 22 year old male with history of kidney stones and migraines who presents with flank pain and hematuria following a urethral stent for a kidney stone on the 9th. He was seen on the 12th for flank pain and hematuria. He was sent home with Norco and Ditropan. He reports taking out his stent today and presents due to inability to manage pain. He also endorses nausea and taking tylenol for pain.    Review of Systems   Genitourinary: Positive for flank pain and hematuria.   All other systems reviewed and are negative.    Allergies:  No known drug allergies     Medications:  Flomax  Ditropan  Norco  Cataflam   Mobic  Lexapro    Past Medical History:     Kidney stones  Migraines       Past Surgical History:    Retrogrades, cytoscopy, laser holmium lithotripsy combined  Laser holmium lithotripsy     Social History:  History of tobacco use  Presents unaccompanied     Physical Exam     Patient Vitals for the past 24 hrs:   BP Temp Temp src Pulse Resp SpO2   02/14/22 1800 (!) 130/101 -- -- 92 -- 94 %   02/14/22 1700 117/63 -- -- 67 -- 100 %   02/14/22 1630 122/68 -- -- 70 -- 99 %   02/14/22 1545 127/77 -- -- 80 -- 100 %   02/14/22 1333 110/81 98.4  F (36.9  C) Temporal 65 18 100 %       Physical Exam  Constitutional:       Comments: Pleasant and cooperative   HENT:      Mouth/Throat:      Pharynx: No posterior oropharyngeal erythema.   Eyes:      Conjunctiva/sclera: Conjunctivae normal.   Cardiovascular:      Rate and Rhythm: Normal rate and regular rhythm.      Heart sounds: Normal heart sounds.   Pulmonary:      Effort: Pulmonary effort is normal.      Breath sounds: Normal breath sounds.   Abdominal:      General: Bowel sounds are normal. There is no distension.      Palpations: Abdomen is soft.      Tenderness: There is abdominal tenderness in the right lower quadrant. There is right CVA tenderness. There is no  guarding or rebound.   Musculoskeletal:         General: Normal range of motion.      Cervical back: Neck supple.   Skin:     General: Skin is warm and dry.   Neurological:      Mental Status: He is alert.         Emergency Department Course     Imaging:  US Renal Complete   Final Result   IMPRESSION:   1.  Moderate to severe right-sided hydronephrosis with no cause for obstruction identified on this exam. Further evaluation with a stone protocol CT may be helpful. Prior CT showed an obstructing 3 mm right ureterovesical junction stone.      XR Abdomen 1 View   Final Result   IMPRESSION: 9 mm calcification projected over the upper pole of the   right kidney is unchanged. No other urinary tract calculi are   visualized. Right-sided ureteral stent has been removed. Large volume   of retained colonic stool. No evidence of bowel obstruction.      BOOKER PENA MD            SYSTEM ID:  [id number]        Report per radiology    Laboratory:  Labs Ordered and Resulted from Time of ED Arrival to Time of ED Departure   ROUTINE UA WITH MICROSCOPIC REFLEX TO CULTURE - Abnormal       Result Value    Color Urine Orange (*)     Appearance Urine Slightly Cloudy (*)     Glucose Urine Negative      Bilirubin Urine Negative      Ketones Urine Negative      Specific Gravity Urine 1.011      Blood Urine Large (*)     pH Urine 7.5 (*)     Protein Albumin Urine 100  (*)     Urobilinogen Urine Normal      Nitrite Urine Negative      Leukocyte Esterase Urine Moderate (*)     Mucus Urine Present (*)     Amorphous Crystals Urine Many (*)     RBC Urine >182 (*)     WBC Urine 40 (*)    BASIC METABOLIC PANEL   COVID-19 VIRUS (CORONAVIRUS) BY PCR   URINE CULTURE      Emergency Department Course:  Reviewed:  I reviewed nursing notes, vitals, past medical history and Care Everywhere    Assessments:  1531 I obtained history and examined the patient as noted above.     Consults:  5651 I spoke with Dr. Arellano of urology regarding patient's  presentation, findings, and plan of care.    1710 I rechecked the patient and he reports still nicky in substantial pain.  I consulted Dr. Arellano again    Interventions:  16928 Toradol, 15 mg, IV   1541 Dilaudid, 0.5 mg, IV   1541 Zofran, 4 mg, IV  1616 Dilaudid, 0.5 mg, IV     Disposition:  The patient was admitted to the hospital     Impression & Plan     Medical Decision Making:    Brendan Hunt is a 22 year old male with recent treatment for kidney stone who presents now with recurrent pain.  Unfortunately, ultrasound does show significant hydronephrosis.  I discussed the case with urology who recommended we hospitalize the patient for continued fluids and pain medication.  I discussed the case with Sally Marie of the hospitalist service.  Patient will be admitted to the observation area where they can consult urology.    Diagnosis:    ICD-10-CM    1. Hydronephrosis, unspecified hydronephrosis type  N13.30        Discharge Medications:  New Prescriptions    No medications on file       Scribe Disclosure:  I, Casey Valerio, am serving as a scribe at 3:31 PM on 2/14/2022 to document services personally performed by Gudelia Payton MD based on my observations and the provider's statements to me.          Gudelia Payton MD  02/14/22 8416

## 2022-02-14 NOTE — ED TRIAGE NOTES
Presets with right flank pain, hematuria and vomiting. Pt is status post kidney stone with stenting on 2/9. VSS on RA.

## 2022-02-15 LAB
ANION GAP SERPL CALCULATED.3IONS-SCNC: 1 MMOL/L (ref 3–14)
BACTERIA UR CULT: NO GROWTH
BUN SERPL-MCNC: 9 MG/DL (ref 7–30)
CALCIUM SERPL-MCNC: 8.6 MG/DL (ref 8.5–10.1)
CHLORIDE BLD-SCNC: 104 MMOL/L (ref 94–109)
CO2 SERPL-SCNC: 33 MMOL/L (ref 20–32)
CREAT SERPL-MCNC: 0.9 MG/DL (ref 0.66–1.25)
ERYTHROCYTE [DISTWIDTH] IN BLOOD BY AUTOMATED COUNT: 11.4 % (ref 10–15)
GFR SERPL CREATININE-BSD FRML MDRD: >90 ML/MIN/1.73M2
GLUCOSE BLD-MCNC: 89 MG/DL (ref 70–99)
HCT VFR BLD AUTO: 38.1 % (ref 40–53)
HGB BLD-MCNC: 12.5 G/DL (ref 13.3–17.7)
MCH RBC QN AUTO: 28.9 PG (ref 26.5–33)
MCHC RBC AUTO-ENTMCNC: 32.8 G/DL (ref 31.5–36.5)
MCV RBC AUTO: 88 FL (ref 78–100)
PLATELET # BLD AUTO: 251 10E3/UL (ref 150–450)
POTASSIUM BLD-SCNC: 4 MMOL/L (ref 3.4–5.3)
RBC # BLD AUTO: 4.32 10E6/UL (ref 4.4–5.9)
SODIUM SERPL-SCNC: 138 MMOL/L (ref 133–144)
WBC # BLD AUTO: 3.1 10E3/UL (ref 4–11)

## 2022-02-15 PROCEDURE — 258N000003 HC RX IP 258 OP 636: Performed by: PHYSICIAN ASSISTANT

## 2022-02-15 PROCEDURE — 99207 PR CDG-CODE CATEGORY CHANGED: CPT | Performed by: INTERNAL MEDICINE

## 2022-02-15 PROCEDURE — 36415 COLL VENOUS BLD VENIPUNCTURE: CPT | Performed by: PHYSICIAN ASSISTANT

## 2022-02-15 PROCEDURE — G0378 HOSPITAL OBSERVATION PER HR: HCPCS

## 2022-02-15 PROCEDURE — 250N000011 HC RX IP 250 OP 636: Performed by: PHYSICIAN ASSISTANT

## 2022-02-15 PROCEDURE — 250N000011 HC RX IP 250 OP 636: Performed by: INTERNAL MEDICINE

## 2022-02-15 PROCEDURE — 99224 PR SUBSEQUENT OBSERVATION CARE,LEVEL I: CPT | Performed by: INTERNAL MEDICINE

## 2022-02-15 PROCEDURE — 250N000013 HC RX MED GY IP 250 OP 250 PS 637: Performed by: PHYSICIAN ASSISTANT

## 2022-02-15 PROCEDURE — 82310 ASSAY OF CALCIUM: CPT | Performed by: PHYSICIAN ASSISTANT

## 2022-02-15 PROCEDURE — 99221 1ST HOSP IP/OBS SF/LOW 40: CPT | Performed by: STUDENT IN AN ORGANIZED HEALTH CARE EDUCATION/TRAINING PROGRAM

## 2022-02-15 PROCEDURE — 96376 TX/PRO/DX INJ SAME DRUG ADON: CPT

## 2022-02-15 PROCEDURE — 250N000013 HC RX MED GY IP 250 OP 250 PS 637: Performed by: STUDENT IN AN ORGANIZED HEALTH CARE EDUCATION/TRAINING PROGRAM

## 2022-02-15 PROCEDURE — 96375 TX/PRO/DX INJ NEW DRUG ADDON: CPT

## 2022-02-15 PROCEDURE — 85027 COMPLETE CBC AUTOMATED: CPT | Performed by: PHYSICIAN ASSISTANT

## 2022-02-15 PROCEDURE — 250N000013 HC RX MED GY IP 250 OP 250 PS 637: Performed by: INTERNAL MEDICINE

## 2022-02-15 RX ORDER — POLYETHYLENE GLYCOL 3350 17 G/17G
17 POWDER, FOR SOLUTION ORAL DAILY
Status: DISCONTINUED | OUTPATIENT
Start: 2022-02-15 | End: 2022-02-17 | Stop reason: HOSPADM

## 2022-02-15 RX ORDER — ACETAMINOPHEN 500 MG
1000 TABLET ORAL DAILY PRN
COMMUNITY

## 2022-02-15 RX ORDER — LORAZEPAM 2 MG/ML
0.5 INJECTION INTRAMUSCULAR EVERY 6 HOURS PRN
Status: DISCONTINUED | OUTPATIENT
Start: 2022-02-15 | End: 2022-02-17 | Stop reason: HOSPADM

## 2022-02-15 RX ORDER — HYDROXYZINE HYDROCHLORIDE 25 MG/1
25 TABLET, FILM COATED ORAL EVERY 6 HOURS PRN
Status: DISCONTINUED | OUTPATIENT
Start: 2022-02-15 | End: 2022-02-17 | Stop reason: HOSPADM

## 2022-02-15 RX ORDER — MAGNESIUM CARB/ALUMINUM HYDROX 105-160MG
296 TABLET,CHEWABLE ORAL ONCE
Status: COMPLETED | OUTPATIENT
Start: 2022-02-15 | End: 2022-02-15

## 2022-02-15 RX ORDER — IBUPROFEN 400 MG/1
400 TABLET, FILM COATED ORAL EVERY 6 HOURS PRN
Status: DISCONTINUED | OUTPATIENT
Start: 2022-02-15 | End: 2022-02-17 | Stop reason: HOSPADM

## 2022-02-15 RX ADMIN — MENTHOL 1 PATCH: 205.5 PATCH TOPICAL at 17:17

## 2022-02-15 RX ADMIN — IBUPROFEN 400 MG: 400 TABLET, FILM COATED ORAL at 13:29

## 2022-02-15 RX ADMIN — ACETAMINOPHEN 975 MG: 325 TABLET, FILM COATED ORAL at 21:07

## 2022-02-15 RX ADMIN — OXYCODONE HYDROCHLORIDE 5 MG: 5 TABLET ORAL at 01:09

## 2022-02-15 RX ADMIN — ACETAMINOPHEN 975 MG: 325 TABLET, FILM COATED ORAL at 05:05

## 2022-02-15 RX ADMIN — LORAZEPAM 0.5 MG: 2 INJECTION INTRAMUSCULAR; INTRAVENOUS at 23:43

## 2022-02-15 RX ADMIN — ONDANSETRON 4 MG: 2 INJECTION INTRAMUSCULAR; INTRAVENOUS at 21:07

## 2022-02-15 RX ADMIN — OXYCODONE HYDROCHLORIDE 5 MG: 5 TABLET ORAL at 13:29

## 2022-02-15 RX ADMIN — ONDANSETRON 4 MG: 4 TABLET, ORALLY DISINTEGRATING ORAL at 05:05

## 2022-02-15 RX ADMIN — ACETAMINOPHEN 975 MG: 325 TABLET, FILM COATED ORAL at 12:43

## 2022-02-15 RX ADMIN — TAMSULOSIN HYDROCHLORIDE 0.4 MG: 0.4 CAPSULE ORAL at 07:43

## 2022-02-15 RX ADMIN — PROCHLORPERAZINE EDISYLATE 10 MG: 5 INJECTION, SOLUTION INTRAMUSCULAR; INTRAVENOUS at 21:59

## 2022-02-15 RX ADMIN — MAGNESIUM CITRATE 296 ML: 1.75 LIQUID ORAL at 21:07

## 2022-02-15 RX ADMIN — SODIUM CHLORIDE, POTASSIUM CHLORIDE, SODIUM LACTATE AND CALCIUM CHLORIDE: 600; 310; 30; 20 INJECTION, SOLUTION INTRAVENOUS at 10:26

## 2022-02-15 RX ADMIN — SODIUM CHLORIDE, POTASSIUM CHLORIDE, SODIUM LACTATE AND CALCIUM CHLORIDE: 600; 310; 30; 20 INJECTION, SOLUTION INTRAVENOUS at 17:11

## 2022-02-15 RX ADMIN — SODIUM CHLORIDE, POTASSIUM CHLORIDE, SODIUM LACTATE AND CALCIUM CHLORIDE: 600; 310; 30; 20 INJECTION, SOLUTION INTRAVENOUS at 03:49

## 2022-02-15 RX ADMIN — OXYCODONE HYDROCHLORIDE 5 MG: 5 TABLET ORAL at 05:05

## 2022-02-15 RX ADMIN — HYDROMORPHONE HYDROCHLORIDE 0.2 MG: 0.2 INJECTION, SOLUTION INTRAMUSCULAR; INTRAVENOUS; SUBCUTANEOUS at 21:59

## 2022-02-15 RX ADMIN — Medication 1 MG: at 01:09

## 2022-02-15 RX ADMIN — SODIUM CHLORIDE, POTASSIUM CHLORIDE, SODIUM LACTATE AND CALCIUM CHLORIDE: 600; 310; 30; 20 INJECTION, SOLUTION INTRAVENOUS at 23:25

## 2022-02-15 RX ADMIN — MENTHOL 1 PATCH: 205.5 PATCH TOPICAL at 23:44

## 2022-02-15 RX ADMIN — OXYCODONE HYDROCHLORIDE 5 MG: 5 TABLET ORAL at 21:07

## 2022-02-15 RX ADMIN — POLYETHYLENE GLYCOL 3350 17 G: 17 POWDER, FOR SOLUTION ORAL at 21:07

## 2022-02-15 NOTE — CONSULTS
[facility] Consultation by [facility]    Brendan Hunt MRN# [id number]   Age: 22 year old YOB: 2000     Date of Admission:  2/14/2022    Reason for consult: Right flank pain and right hydronephrosis       Requesting PA/MD: RAINA Marie PA-C       Level of consult: Consult, follow and place orders           Assessment and Plan:   Assessment:   Right hydronephrosis  Possible UTI  Constipation      Plan:   -Pain and nausea management per primary service.   -Discussed possible replacement of indwelling ureteral stent or placement of nephrostomy tube for hydronephrosis.  Patient notes that he has had discomfort during the entire time of stent placement and does not want another stent.  He also does not want to undergo nephrostomy tube placement.  -Flomax 0.4 mg daily.  -Will monitor today.    -NPO at midnight.  -If pain persists tomorrow or evidence of PIETRO, would recommend replacing a ureteral stent.  -Follow cultures.  Treat with culture specific antibiotics if positive.  -Possible renal scan in the future.  Patient has known hydronephrosis and hydro ureter so this would not be the trigger for stent placement.  -Aggressive bowel regimen recommended as KUB shows significant stool burden.    Windy Lazar PA-C   [facility]  [phone]               Chief Complaint:   Right flank pain and right hydronephrosis     History is obtained from the patient and EMR.         History of Present Illness:   This patient is a 22 year old male who presented to the ER yesterday with flank pain and hematuria.  He had an indwelling ureteral stent placed after ureteroscopy with Dr. Alfaro on 02/09/2021.  Patient initially had clots and increased pain with difficulty with urination.  Patient presented to the ER in 02/12/2022 as he felt like he could not urinate and did not have any more pain medication.  They talked to Dr. Alfaro's partner, and they recommend oxybutynin and follow-up.    Patient  had initially been seen by Dr. Bartlett when he was taken to the OR for cystoscopy and stone removal on 01/12/2022.  There were no stones noted in the ureter and they found a 3 mm stone in the bladder.  Stone composition was 10% calcium oxalate mono and dihydrate, percent calcium monohydrate and phosphate dihydrate and 80% calcium phosphate.  He was recommended to follow-up with Dr. García for long-term management.  He saw him on 02/0/22 for concern for dysuria after ureteroscopy.  Urine culture was obtained.  This showed less than 10,000 CFU per mL of mixed radha.  Patient was recommended to follow-up with him with a KUB, renal ultrasound, [facility], and office visit.    Patient was instructed to pull out his ureteral stent initially per operative report on 02/11/2022.  Patient notes that he pulled this out at home yesterday and continue to have hematuria and flank pain.  He also endorsed significant nausea, but no vomiting.  The decision imaging shows constipation.  Kidney function is within normal limits creatinine 0.90 with EGFR of greater than 90.  Urine culture is in process.    Hemoglobin 12.5 (13.2).  WBC 3.1 (6.3).  Patient is afebrile.  Denies any fevers, chills, shortness of breath, chest pain.  No tachycardia.  Continues to endorse right lower quadrant.  Declined [facility] .    Patient had pain the entire time with his stent.  He does not really want another one.              Past Medical History:     Past Medical History:   Diagnosis Date     Headache 12/16/2019     Kidney stones      Migraine with aura and without status migrainosus, not intractable 12/16/2019     Nephrolithiasis 12/16/2019           Past Surgical History:     Past Surgical History:   Procedure Laterality Date     COMBINED CYSTOSCOPY, RETROGRADES, URETEROSCOPY, LASER HOLMIUM LITHOTRIPSY URETER(S), INSERT STENT Right 1/2/2022    Procedure: 1. Cystourethroscopy 2. Right ureteroscopy with stone extraction;  Surgeon: Sanna Bartlett,  MD;  Location: [facility] OR     LASER HOLMIUM LITHOTRIPSY URETER(S), INSERT STENT, COMBINED Right 2/9/2022    Procedure: CYSTOURETEROSCOPY, RIGHT RETROGRADE, URETEROSCOPY, LASER STANDBY, RIGHT URETERAL STENT PLACEMENT;  Surgeon: Curtis Alfaro MD;  Location: [facility] OR          Social History:     Current every day smoker.         Family History:   No family history on file.  Family history reviewed.             Allergies:   No Known Allergies          Medications:     Current Facility-Administered Medications   Medication     acetaminophen (TYLENOL) tablet 975 mg     HYDROmorphone (DILAUDID) injection 0.2 mg     lactated ringers infusion     lidocaine (LMX4) cream     lidocaine 1 % 0.1-1 mL     melatonin tablet 1 mg     naloxone (NARCAN) injection 0.2 mg    Or     naloxone (NARCAN) injection 0.4 mg    Or     naloxone (NARCAN) injection 0.2 mg    Or     naloxone (NARCAN) injection 0.4 mg     ondansetron (ZOFRAN-ODT) ODT tab 4 mg    Or     ondansetron (ZOFRAN) injection 4 mg     oxyCODONE (ROXICODONE) tablet 5 mg     prochlorperazine (COMPAZINE) injection 10 mg    Or     prochlorperazine (COMPAZINE) tablet 10 mg    Or     prochlorperazine (COMPAZINE) suppository 25 mg     senna-docusate (SENOKOT-S/PERICOLACE) 8.6-50 MG per tablet 1 tablet    Or     senna-docusate (SENOKOT-S/PERICOLACE) 8.6-50 MG per tablet 2 tablet     sodium chloride (PF) 0.9% PF flush 3 mL     sodium chloride (PF) 0.9% PF flush 3 mL     tamsulosin (FLOMAX) capsule 0.4 mg             Review of Systems:   A comprehensive 10-point review of systems was performed and found to be negative except as described in the HPI.     /67 (BP Location: Right arm)   Pulse 61   Temp 97.5  F (36.4  C) (Oral)   Resp 20   SpO2 96%   PSYCH: NAD  EYES: EOMI  MOUTH: MMM  NECK: Supple, no notable adenopathy  RESP: Unlabored breathing, RA  CARDIAC: No LE edema, regular radial pulse  SKIN: Warm, no rashes  ABD: soft, RLQ tenderness  NEURO: AAO x3  URO: Urinating on own  with hematuria and dysuria.         Data:     Lab Results   Component Value Date    WBC 3.1 (L) 02/15/2022    HGB 12.5 (L) 02/15/2022    HCT 38.1 (L) 02/15/2022    MCV 88 02/15/2022     02/15/2022     Lab Results   Component Value Date    CR 0.90 02/15/2022    CR 0.80 02/14/2022     Recent Labs   Lab 02/14/22  1442   COLOR Orange*   APPEARANCE Slightly Cloudy*   URINEGLC Negative   URINEBILI Negative   URINEKETONE Negative   SG 1.011   URINEPH 7.5*   PROTEIN 100 *   NITRITE Negative   LEUKEST Moderate*   RBCU >182*   WBCU 40*     Urine culture: in process.    US Renal Complete    Result Date: 2/14/2022  EXAM: US RENAL COMPLETE LOCATION: [facility] DATE/TIME: 2/14/2022 5:13 PM INDICATION: Flank pain, hematuria, previous abnormal CT exam with obstructing right ureterovesical junction stone. COMPARISON: None. TECHNIQUE: Routine Bilateral Renal and Bladder Ultrasound. FINDINGS: RIGHT KIDNEY: 11.9 x 5.3 x 6.4 cm. Moderate to severe pelvocaliectasis. No discrete focal lesion or shadowing stone. LEFT KIDNEY: 11.4 x 4.7 x 5.2 cm. Normal without hydronephrosis or masses. No shadowing stone. BLADDER: Normal.     IMPRESSION: 1.  Moderate to severe right-sided hydronephrosis with no cause for obstruction identified on this exam. Further evaluation with a stone protocol CT may be helpful. Prior CT showed an obstructing 3 mm right ureterovesical junction stone.    XR Surgery MILTON L/T 5 Min Fluoro w Stills    Result Date: 2/9/2022  This exam was marked as non-reportable because it will not be read by a radiologist or a Preemption non-radiologist provider.     XR Abdomen 1 View    Result Date: 2/14/2022  ABDOMEN ONE VIEW  2/14/2022 4:12 PM HISTORY: Renal calculi COMPARISON: 2/12/2022     IMPRESSION: 9 mm calcification projected over the upper pole of the right kidney is unchanged. No other urinary tract calculi are visualized. Right-sided ureteral stent has been removed. Large volume of retained colonic  stool. No evidence of bowel obstruction. BOOKER PENA MD   SYSTEM ID:  [id number]    KUB XR    Result Date: 2/12/2022  EXAM: XR KUB LOCATION: [facility] DATE/TIME: 2/12/2022 8:02 PM INDICATION: Hematuria. COMPARISON: None.     IMPRESSION: A right double-J ureteral stent is in place. A 0.8 cm stone or cluster of stones is noted in the upper pole of the right kidney. No other convincing urinary calculi are seen. Moderate amount of stool throughout the colon.

## 2022-02-15 NOTE — UTILIZATION REVIEW
"Admission Status; Secondary Review Determination     Admission Date: 2/14/2022  2:19 PM       Under the authority of the Utilization Management Committee, the utilization review process indicated a secondary review on the above patient.  The review outcome is based on review of the medical records, discussions with staff, and applying clinical experience noted on the date of the review.          (x) Observation Status Appropriate - This patient does not meet hospital inpatient criteria and is placed in observation status. If this patient's primary payer is Medicare and was admitted as an inpatient, Condition Code 44 should be used and patient status changed to \"observation\".       RATIONALE FOR DETERMINATION      Brief clinical presentation, information copied from the chart, abbreviated and edited for relevant content:     Discussed with Dr. Grigsby - advised to stay OBS. He is only staying for pain control. Otherwise medically stable. '    Brendan Hunt is a 22 year old male with a PMH significant for migraines and kidney stones with recent ureteral stent placement on 2/9 who presents with intractable right mid abdominal and flank pain associated with nausea and hematuria.  Found to have moderate to severe right hydronephrosis.With significant pain.   Right ureteral stent removed on 2/14. On IVF and pain medications. Urology following.     Patient is clinically improving and there is no clear indication to change the patient's status to inpatient. The severity of illness, intensity of cares provided, risk for adverse outcome, and expected LOS make the care appropriate for observation.       The information on this document is developed by the utilization review team in order for the business office to ensure compliance.  This only denotes the appropriateness of proper admission status and does not reflect the quality of care rendered.         The definitions of Inpatient Status and Observation Status used in " making the determination above are those provided in the CMS Coverage Manual, Chapter 1 and Chapter 6, section 70.4.      Sincerely,     Susy Leblanc MD   Utilization Review/ Case Management  [facility].

## 2022-02-15 NOTE — H&P
History and Physical     Brendan Hunt MRN# [id number]   YOB: 2000 Age: 22 year old      Date of Admission:  2/14/2022    Primary care provider: No Ref-Primary, Physician          Assessment and Plan:   Brendan Hunt is a 22 year old male with a PMH significant for migraines and kidney stones with recent stent placement on 2/9 who presents with intractable right mid abdominal and flank pain associated with nausea and hematuria.    Patient was discussed with Dr. Gunn, who was provider in ED. Chart review of ED work up was reviewed as well as chart review of Care Everywhere, previous visits and admissions.     #Right sided nephrolithiasis  -Originally presented on 2/9 with 3 mm obstructing stone causing intractable right flank pain  -Went to the OR on 2/9 for stent placement and was discharged home  -No relief of pain since stent placement and continues to have hematuria  -Pulled out stent this 2/14 AM with no relief  -Lab work pending  -Abd x ray shows no stone, Kidney ultrasound shows moderate to severe pelvocaliectasis  -Suspect stone still present  -Normal saline at 150 ml/hr  -Flomax daily  -Strain urine  -Urology consult  -NPO at midnight      Social: No concerns  Code: Discussed with patient and they have chosen full code  VTE prophylaxis: PCDs  Disposition: Observation                     Chief Complaint:   Right flank and mid abdominal pain with hematuria         History of Present Illness:   Brendan Hunt is a 22 year old male who presents with intractable right flank and mid abdominal pain with hematuria.  He had a stent placed for a 3 millimeters stone on 2/9.  He states that he has not had any relief of pain since this time.  He pulled the stent this morning and continues to not have relief.  He has nausea with some vomiting and has been unable to eat or drink anything.  He notes blood in his urine but no dysuria.  He denies fever, chills, cough, shortness of breath and chest  discomfort.             Past Medical History:     Past Medical History:   Diagnosis Date     Headache 12/16/2019     Kidney stones      Migraine with aura and without status migrainosus, not intractable 12/16/2019     Nephrolithiasis 12/16/2019               Past Surgical History:     Past Surgical History:   Procedure Laterality Date     COMBINED CYSTOSCOPY, RETROGRADES, URETEROSCOPY, LASER HOLMIUM LITHOTRIPSY URETER(S), INSERT STENT Right 1/2/2022    Procedure: 1. Cystourethroscopy 2. Right ureteroscopy with stone extraction;  Surgeon: Sanna Bartlett MD;  Location:  OR     LASER HOLMIUM LITHOTRIPSY URETER(S), INSERT STENT, COMBINED Right 2/9/2022    Procedure: CYSTOURETEROSCOPY, RIGHT RETROGRADE, URETEROSCOPY, LASER STANDBY, RIGHT URETERAL STENT PLACEMENT;  Surgeon: Curtis Alfaro MD;  [place] [facility] [place]               Social History:     Social History     Socioeconomic History     Marital status: Single     Spouse name: Not on file     Number of children: Not on file     Years of education: Not on file     Highest education level: Not on file   Occupational History     Not on file   Tobacco Use     Smoking status: Current Every Day Smoker     Smokeless tobacco: Never Used   Substance and Sexual Activity     Alcohol use: Not on file     Drug use: Not on file     Sexual activity: Not on file   Other Topics Concern     Not on file   Social History Narrative     Not on file     Social Determinants of Health     Financial Resource Strain: Not on file   Food Insecurity: Not on file   Transportation Needs: Not on file   Physical Activity: Not on file   Stress: Not on file   Social Connections: Not on file   Intimate Partner Violence: Not on file   Housing Stability: Not on file               Family History:     Family history reviewed and is non contributory         Allergies:    No Known Allergies            Medications:     Prior to Admission medications    Medication Sig Last Dose Taking? Auth Provider    diclofenac (CATAFLAM) 50 MG tablet Take 1 tablet (50 mg) by mouth 3 times daily as needed for moderate pain   Sanna Bartlett MD   HYDROcodone-acetaminophen (NORCO) 5-325 MG tablet Take 1 tablet by mouth every 6 hours as needed for pain   Uday Cadet MD   oxybutynin (DITROPAN) 5 MG tablet Take 1 tablet (5 mg) by mouth 3 times daily as needed for bladder spasms   Curtis Alfaro MD   oxybutynin ER (DITROPAN-XL) 10 MG 24 hr tablet Take 1 tablet (10 mg) by mouth daily for 10 days   Uday Cadet MD   tamsulosin (FLOMAX) 0.4 MG capsule Take 1 capsule (0.4 mg) by mouth daily   Curtis Alfaro MD              Review of Systems:   A Comprehensive greater than 10 system review of systems was carried out.  Pertinent positives and negatives are noted above.  Otherwise negative for contributory information.            Physical Exam:   Blood pressure (!) 130/101, pulse 92, temperature 98.4  F (36.9  C), temperature source Temporal, resp. rate 18, SpO2 94 %.  Exam:  GENERAL:  Comfortable.  PSYCH: pleasant, oriented, No acute distress.  HEENT:  PERRLA. Normal conjunctiva, normal hearing, nasal mucosa and Oropharynx are normal.  NECK:  Supple, no neck vein distention, adenopathy or bruits, normal thyroid.  HEART:  Normal S1, S2 with no murmur, no pericardial rub, gallops or S3 or S4.  LUNGS:  Clear to auscultation, normal Respiratory effort. No wheezing, rales or ronchi.  ABDOMEN:  Soft, no hepatosplenomegaly, normal bowel sounds. Tender right mid abdomen and flank, non distended.   EXTREMITIES:  No pedal edema, +2 pulses bilateral and equal.  SKIN:  Dry to touch, No rash, wound or ulcerations.  NEUROLOGIC:  CN 2-12 grossly intact, sensation is intact with no focal deficits.               Data:     Recent Labs   Lab 02/14/22  1833   WBC 6.3   HGB 13.2*   HCT 39.3*   MCV 87        Recent Labs   Lab 02/14/22  1833      POTASSIUM 4.4   CHLORIDE 105   CO2 29   ANIONGAP 4   GLC 97   BUN 10    CR 0.80   GFRESTIMATED >90   PARKER 8.8     Recent Labs   Lab 02/14/22  1442   COLOR Orange*   APPEARANCE Slightly Cloudy*   URINEGLC Negative   URINEBILI Negative   URINEKETONE Negative   SG 1.011   UBLD Large*   URINEPH 7.5*   PROTEIN 100 *   NITRITE Negative   LEUKEST Moderate*   RBCU >182*   WBCU 40*         Recent Results (from the past 24 hour(s))   XR Abdomen 1 View    Narrative    ABDOMEN ONE VIEW  2/14/2022 4:12 PM     HISTORY: Renal calculi    COMPARISON: 2/12/2022      Impression    IMPRESSION: 9 mm calcification projected over the upper pole of the  right kidney is unchanged. No other urinary tract calculi are  visualized. Right-sided ureteral stent has been removed. Large volume  of retained colonic stool. No evidence of bowel obstruction.    BOOKER PENA MD         SYSTEM ID:  [id number]   US Renal Complete    Narrative    EXAM: US RENAL COMPLETE  LOCATION: [facility]  DATE/TIME: 2/14/2022 5:13 PM    INDICATION: Flank pain, hematuria, previous abnormal CT exam with obstructing right ureterovesical junction stone.  COMPARISON: None.  TECHNIQUE: Routine Bilateral Renal and Bladder Ultrasound.    FINDINGS:    RIGHT KIDNEY: 11.9 x 5.3 x 6.4 cm. Moderate to severe pelvocaliectasis. No discrete focal lesion or shadowing stone.     LEFT KIDNEY: 11.4 x 4.7 x 5.2 cm. Normal without hydronephrosis or masses. No shadowing stone.    BLADDER: Normal.      Impression    IMPRESSION:  1.  Moderate to severe right-sided hydronephrosis with no cause for obstruction identified on this exam. Further evaluation with a stone protocol CT may be helpful. Prior CT showed an obstructing 3 mm right ureterovesical junction stone.         Aleyda Marie PA-C

## 2022-02-15 NOTE — PHARMACY-ADMISSION MEDICATION HISTORY
Admission medication history interview status for this patient is complete. See [facility] admission navigator for allergy information, prior to admission medications and immunization status.     Medication history interview done, indicate source(s): Patient  Medication history resources (including written lists, pill bottles, clinic record):Picture of written prescriptions    Changes made to PTA medication list:  Added: acetaminophen  Changed: none  Reported as Not Taking: oxybutynin 5 mg three times daily as needed  Removed: diclofenac, tamsulosin (therapy completed)    Actions taken by pharmacist (provider contacted, etc):None     Additional medication history information: Patient states that he has not picked up the following prescriptions yet: hydrocodone-acetaminophen, oxybutynin ER    Medication reconciliation/reorder completed by provider prior to medication history?  N   (Y/N)       Prior to Admission medications    Medication Sig Last Dose Taking? Auth Provider   acetaminophen (TYLENOL) 500 MG tablet Take 1,000 mg by mouth daily as needed for pain  at prn Yes Unknown, Entered By History   HYDROcodone-acetaminophen (NORCO) 5-325 MG tablet Take 1 tablet by mouth every 6 hours as needed for pain  at not started yet  Uday Cadet MD   oxybutynin (DITROPAN) 5 MG tablet Take 1 tablet (5 mg) by mouth 3 times daily as needed for bladder spasms  Patient not taking: Reported on 2/15/2022 Not Taking at Unknown time  Curtis Alfaro MD   oxybutynin ER (DITROPAN-XL) 10 MG 24 hr tablet Take 1 tablet (10 mg) by mouth daily for 10 days  at not started yet  Uday Cadet MD

## 2022-02-15 NOTE — ED NOTES
[facility]  ED Nurse Handoff Report    Brendan Hunt is a 22 year old male   ED Chief complaint: Flank Pain, Hematuria, and Post-op Problem  . ED Diagnosis:   Final diagnoses:   Hydronephrosis, unspecified hydronephrosis type     Allergies: No Known Allergies    Code Status: Full Code  Activity level - Baseline/Home:  Independent. Activity Level - Current:   Independent. Lift room needed: No. Bariatric: No   Needed: Yes   Isolation: No. Infection: Not Applicable.     Vital Signs:   Vitals:    02/14/22 1333 02/14/22 1545   BP: 110/81 127/77   Pulse: 65 80   Resp: 18    Temp: 98.4  F (36.9  C)    TempSrc: Temporal    SpO2: 100% 100%       Cardiac Rhythm:  ,      Pain level:    Patient confused: No. Patient Falls Risk: No.   Elimination Status: Has voided   Patient Report - Initial Complaint: Flank pain. Focused Assessment: Brendan Hunt is a 22 year old male with history of kidney stones and migraines who presents with flank pain and hematuria following a urethral stent for a kidney stone on the 9th. He was seen on the 12th for flank pain and hematuria. He was sent home with Norco and Ditropan. He reports taking out his stent today and presents due to inability to manage pain. He also endorses nausea and taking tylenol for pain.     Review of Systems   Genitourinary: Positive for flank pain and hematuria.   All other systems reviewed and are negative.   Tests Performed: labs, US, CT. Abnormal Results:   US Renal Complete   Final Result   IMPRESSION:   1.  Moderate to severe right-sided hydronephrosis with no cause for obstruction identified on this exam. Further evaluation with a stone protocol CT may be helpful. Prior CT showed an obstructing 3 mm right ureterovesical junction stone.      XR Abdomen 1 View   Final Result   IMPRESSION: 9 mm calcification projected over the upper pole of the   right kidney is unchanged. No other urinary tract calculi are   visualized. Right-sided  ureteral stent has been removed. Large volume   of retained colonic stool. No evidence of bowel obstruction.      BOOKER PENA MD            SYSTEM ID:  [id number]        Labs Ordered and Resulted from Time of ED Arrival to Time of ED Departure   ROUTINE UA WITH MICROSCOPIC REFLEX TO CULTURE - Abnormal       Result Value    Color Urine Orange (*)     Appearance Urine Slightly Cloudy (*)     Glucose Urine Negative      Bilirubin Urine Negative      Ketones Urine Negative      Specific Gravity Urine 1.011      Blood Urine Large (*)     pH Urine 7.5 (*)     Protein Albumin Urine 100  (*)     Urobilinogen Urine Normal      Nitrite Urine Negative      Leukocyte Esterase Urine Moderate (*)     Mucus Urine Present (*)     Amorphous Crystals Urine Many (*)     RBC Urine >182 (*)     WBC Urine 40 (*)    URINE CULTURE   .   Treatments provided: pain mgmt  Family Comments: N/a  OBS brochure/video discussed/provided to patient:  Yes  ED Medications:   Medications   HYDROmorphone (PF) (DILAUDID) injection 0.5 mg (0.5 mg Intravenous Given 2/14/22 1730)   ondansetron (ZOFRAN) injection 4 mg (4 mg Intravenous Given 2/14/22 1541)   ketorolac (TORADOL) injection 15 mg (15 mg Intravenous Given 2/14/22 1540)   HYDROmorphone (PF) (DILAUDID) injection 0.5 mg (0.5 mg Intravenous Given 2/14/22 1541)     Drips infusing:  No  For the majority of the shift, the patient's behavior Green. Interventions performed were N/a.    Sepsis treatment initiated: No     Patient tested for COVID 19 prior to admission: YES    ED Nurse Name/Phone Number: Dee Lyn RN,   6:15 PM    RECEIVING UNIT ED HANDOFF REVIEW    Above ED Nurse Handoff Report was reviewed: Yes  Reviewed by: Cindi Sharp RN on February 14, 2022 at 8:24 PM

## 2022-02-15 NOTE — PROGRESS NOTES
[facility]    Medicine Progress Note - Hospitalist Service    Date of Admission:  2/14/2022    Assessment & Plan          Brendan uHnt is a 22 year old male with a PMH significant for migraines and kidney stones with recent ureteral stent placement on 2/9 who presents with intractable right mid abdominal and flank pain associated with nausea and hematuria.  Found to have moderate to severe right hydronephrosis.    1.  Right-sided hydronephrosis.  With significant pain.  Status post recent right ureteral stent placement.  Right ureteral stent removed on 2/14.  -Pain medications as needed.  -Continue IV fluids.  -Appreciate urology input.  -N.p.o. after midnight.  -Continue tamsulosin 0.4 mg a day.    2.  Hematuria.  Likely related to recent ureterolithiasis.  -Appreciate urology input.  -Recheck CBC tomorrow.       Diet: NPO for Medical/Clinical Reasons Except for: Meds, Ice Chips  Regular Diet Adult    DVT Prophylaxis: Ambulate every shift.  Metz Catheter: Not present  Central Lines: None  Cardiac Monitoring: None  Code Status: Full Code        Byron Cardenas DO  Hospitalist Service  [facility]  Securely message with the Vocera Web Console (learn more here)  Text page via [employer] Paging/Directory         Clinically Significant Risk Factors Present on Admission                     ______________________________________________________________________    Interval History   Continues to have right flank pain.  Nausea this morning.  Felt feverish overnight.  Denies chest pain, shortness of breath, vomiting, or diarrhea.    Data reviewed today: I reviewed all medications, new labs and imaging results over the last 24 hours.     Physical Exam   Vital Signs: Temp: 97.5  F (36.4  C) Temp src: Oral BP: 100/67 Pulse: 61   Resp: 20 SpO2: 96 % O2 Device: None (Room air)    Weight: 0 lbs 0 oz  Gen:  NAD, A&Ox3.  Eyes:  PERRL, sclera anicteric.  OP:  MMM, no lesions.  Neck:   Supple.  CV:  Regular, no murmurs.  Lung:  CTA b/l, normal effort.  Ab:  +BS, soft.  Skin:  Warm, dry to touch.  No rash.  Ext:  No pitting edema LE b/l.      Data   Recent Labs   Lab 02/15/22  0735 02/14/22  1833   WBC 3.1* 6.3   HGB 12.5* 13.2*   MCV 88 87    266    138   POTASSIUM 4.0 4.4   CHLORIDE 104 105   CO2 33* 29   BUN 9 10   CR 0.90 0.80   ANIONGAP 1* 4   PARKER 8.6 8.8   GLC 89 97

## 2022-02-15 NOTE — PLAN OF CARE
Orientation: Alert and oriented x4. Using  iPad for in depth conversation, can communicate together at a basic level without iPad.  VSS. 99% on RA.   LS: clear and equal bilaterally  GI: Constant nausea overnight despite zofran and compazine given. No episodes of emesis. RLQ tender to palpation. Tolerated fluids and solids this evening, maintained NPO x meds since 0000.  : Adequate urine output. Urine malodorous, gabbi, frothy. No overt hematuria noted overnight. Straining - no sediment found. Pt reports feeling like he is not emptying his bladder all of the way. Denies pain with urination.  Skin: WDL  IV: L hand WDL with LR @ 150.  Activity: Independent. Pt slept between cares, melatonin PRN dose given (see MAR).   Pain: 5-8/10. Pain in RLQ mostly. Using tylenol and oxycodone for pain effectively per pt. Using aqua K heating pad on flank and warm blankets for comfort.  Updates/Plan: Urology consult today. Mother Margy's number is on the whiteboard, would like updates on the plan this morning.

## 2022-02-16 ENCOUNTER — APPOINTMENT (OUTPATIENT)
Dept: CT IMAGING | Facility: CLINIC | Age: 22
End: 2022-02-16
Attending: STUDENT IN AN ORGANIZED HEALTH CARE EDUCATION/TRAINING PROGRAM
Payer: COMMERCIAL

## 2022-02-16 LAB
ANION GAP SERPL CALCULATED.3IONS-SCNC: 2 MMOL/L (ref 3–14)
BUN SERPL-MCNC: 6 MG/DL (ref 7–30)
CALCIUM SERPL-MCNC: 8.4 MG/DL (ref 8.5–10.1)
CHLORIDE BLD-SCNC: 106 MMOL/L (ref 94–109)
CO2 SERPL-SCNC: 31 MMOL/L (ref 20–32)
CREAT SERPL-MCNC: 0.85 MG/DL (ref 0.66–1.25)
ERYTHROCYTE [DISTWIDTH] IN BLOOD BY AUTOMATED COUNT: 11.1 % (ref 10–15)
GFR SERPL CREATININE-BSD FRML MDRD: >90 ML/MIN/1.73M2
GLUCOSE BLD-MCNC: 103 MG/DL (ref 70–99)
HCT VFR BLD AUTO: 38.6 % (ref 40–53)
HGB BLD-MCNC: 12.9 G/DL (ref 13.3–17.7)
MCH RBC QN AUTO: 29.5 PG (ref 26.5–33)
MCHC RBC AUTO-ENTMCNC: 33.4 G/DL (ref 31.5–36.5)
MCV RBC AUTO: 88 FL (ref 78–100)
PLATELET # BLD AUTO: 264 10E3/UL (ref 150–450)
POTASSIUM BLD-SCNC: 4.4 MMOL/L (ref 3.4–5.3)
RBC # BLD AUTO: 4.38 10E6/UL (ref 4.4–5.9)
SODIUM SERPL-SCNC: 139 MMOL/L (ref 133–144)
WBC # BLD AUTO: 3.9 10E3/UL (ref 4–11)

## 2022-02-16 PROCEDURE — 85027 COMPLETE CBC AUTOMATED: CPT | Performed by: PHYSICIAN ASSISTANT

## 2022-02-16 PROCEDURE — G0378 HOSPITAL OBSERVATION PER HR: HCPCS

## 2022-02-16 PROCEDURE — 74176 CT ABD & PELVIS W/O CONTRAST: CPT

## 2022-02-16 PROCEDURE — 80048 BASIC METABOLIC PNL TOTAL CA: CPT | Performed by: PHYSICIAN ASSISTANT

## 2022-02-16 PROCEDURE — 96361 HYDRATE IV INFUSION ADD-ON: CPT

## 2022-02-16 PROCEDURE — 250N000013 HC RX MED GY IP 250 OP 250 PS 637: Performed by: PHYSICIAN ASSISTANT

## 2022-02-16 PROCEDURE — 250N000013 HC RX MED GY IP 250 OP 250 PS 637: Performed by: STUDENT IN AN ORGANIZED HEALTH CARE EDUCATION/TRAINING PROGRAM

## 2022-02-16 PROCEDURE — 258N000003 HC RX IP 258 OP 636: Performed by: PHYSICIAN ASSISTANT

## 2022-02-16 PROCEDURE — 99207 PR CDG-CODE CATEGORY CHANGED: CPT | Performed by: INTERNAL MEDICINE

## 2022-02-16 PROCEDURE — 36415 COLL VENOUS BLD VENIPUNCTURE: CPT | Performed by: PHYSICIAN ASSISTANT

## 2022-02-16 PROCEDURE — 99224 PR SUBSEQUENT OBSERVATION CARE,LEVEL I: CPT | Performed by: INTERNAL MEDICINE

## 2022-02-16 PROCEDURE — 258N000003 HC RX IP 258 OP 636: Performed by: INTERNAL MEDICINE

## 2022-02-16 RX ORDER — SODIUM CHLORIDE 9 MG/ML
INJECTION, SOLUTION INTRAVENOUS CONTINUOUS
Status: DISCONTINUED | OUTPATIENT
Start: 2022-02-16 | End: 2022-02-17 | Stop reason: HOSPADM

## 2022-02-16 RX ADMIN — ACETAMINOPHEN 975 MG: 325 TABLET, FILM COATED ORAL at 05:51

## 2022-02-16 RX ADMIN — POLYETHYLENE GLYCOL 3350 17 G: 17 POWDER, FOR SOLUTION ORAL at 07:43

## 2022-02-16 RX ADMIN — SODIUM CHLORIDE, POTASSIUM CHLORIDE, SODIUM LACTATE AND CALCIUM CHLORIDE: 600; 310; 30; 20 INJECTION, SOLUTION INTRAVENOUS at 05:51

## 2022-02-16 RX ADMIN — SODIUM CHLORIDE: 9 INJECTION, SOLUTION INTRAVENOUS at 23:57

## 2022-02-16 RX ADMIN — SODIUM CHLORIDE, POTASSIUM CHLORIDE, SODIUM LACTATE AND CALCIUM CHLORIDE: 600; 310; 30; 20 INJECTION, SOLUTION INTRAVENOUS at 12:33

## 2022-02-16 RX ADMIN — ACETAMINOPHEN 975 MG: 325 TABLET, FILM COATED ORAL at 12:30

## 2022-02-16 RX ADMIN — ACETAMINOPHEN 975 MG: 325 TABLET, FILM COATED ORAL at 21:24

## 2022-02-16 RX ADMIN — SODIUM CHLORIDE: 9 INJECTION, SOLUTION INTRAVENOUS at 13:56

## 2022-02-16 RX ADMIN — TAMSULOSIN HYDROCHLORIDE 0.4 MG: 0.4 CAPSULE ORAL at 07:43

## 2022-02-16 NOTE — PROGRESS NOTES
[facility]    Medicine Progress Note - Hospitalist Service    Date of Admission:  2/14/2022    Assessment & Plan          Brendan Hunt is a 22 year old male with a PMH significant for migraines and kidney stones with recent ureteral stent placement on 2/9 who presents with intractable right mid abdominal and flank pain associated with nausea and hematuria.  Found to have moderate to severe right hydronephrosis.     1.  Right-sided hydronephrosis.  With flank pain.  Status post recent right ureteral stent placement.  Right ureteral stent removed on 2/14.  -Pain medications as needed.  -Continue IV fluids.  -Appreciate urology input.  -Continue tamsulosin 0.4 mg a day.     2.  Hematuria.  Likely related to recent ureterolithiasis.  -Appreciate urology input.  -Hemoglobin stable.  -Recheck hemoglobin intermittently.          Diet: NPO for Medical/Clinical Reasons Except for: Meds, Ice Chips    DVT Prophylaxis: Ambulate every shift  Metz Catheter: Not present  Central Lines: None  Cardiac Monitoring: None  Code Status: Full Code          Byron Cardenas DO  Hospitalist Service  [facility]  Securely message with the Vocera Web Console (learn more here)  Text page via [employer] Paging/Directory         Clinically Significant Risk Factors Present on Admission                     ______________________________________________________________________    Interval History   Flank pain under better control. Occasional nausea. Denies chest pain, shortness of breath, fevers, chills, or diarrhea.    Data reviewed today: I reviewed all medications, new labs and imaging results over the last 24 hours.     Physical Exam   Vital Signs: Temp: 98.7  F (37.1  C) Temp src: Oral BP: 124/80 Pulse: 68   Resp: 16 SpO2: 100 % O2 Device: None (Room air)    Weight: 0 lbs 0 oz  Gen:  NAD, A&Ox3.  Eyes:  PERRL, sclera anicteric.  OP:  MMM, no lesions.  Neck:  Supple.  CV:  Regular, no  murmurs.  Lung:  CTA b/l, normal effort.  Ab:  +BS, soft.  Skin:  Warm, dry to touch.  No rash.  Ext:  No pitting edema LE b/l.      Data   Recent Labs   Lab 02/16/22  0609 02/15/22  0735 02/14/22  1833   WBC 3.9* 3.1* 6.3   HGB 12.9* 12.5* 13.2*   MCV 88 88 87    251 266    138 138   POTASSIUM 4.4 4.0 4.4   CHLORIDE 106 104 105   CO2 31 33* 29   BUN 6* 9 10   CR 0.85 0.90 0.80   ANIONGAP 2* 1* 4   PARKER 8.4* 8.6 8.8   * 89 97

## 2022-02-16 NOTE — PLAN OF CARE
Vital Signs:VSS. Afebrile.  Pain/Comfort: Continues to have flank pain, rates as 8-9/10. Appears comfortable and relaxed, attempted to re-orient to pain scale. Tylenol, Motrin, oxycodone given. Started an icy hot patch.  Assessment: Pain right flank  Diet: Regular diet  Output: Voiding. No stones.  Activity/Ambulation: Up in room independently.

## 2022-02-16 NOTE — UTILIZATION REVIEW
Continued stay Observation     Concurrent stay review; Secondary Review Determination         Under the authority of the Utilization Management Committee, the utilization review process indicated a secondary review on the above patient.  The review outcome is based on review of the medical records, discussions with staff, and applying clinical experience noted on the date of the review.          (x) Observation Status Appropriate - Concurrent stay review    RATIONALE FOR DETERMINATION   22-year-old male with history of migraines and kidney stones with recent ureteral placement on 2/9/2022, was admitted to [facility] on 2/14/2022 with right-sided hydronephrosis.  Patient stent was removed on 2/14/2022.  Patient has been recommended replacement of indwelling ureteral stent for nephrostomy tube placement both of which he has been declining.  He is continuing on Flomax.  He does not meet inpatient criteria at this time.    Patient is clinically improving and there is no clear indication to change patient's status to inpatient. The severity of illness, intensity of service provided, expected LOS and risk for adverse outcome make the care appropriate for observation.      This document was produced using voice recognition software       The information on this document is developed by the utilization review team in order for the business office to ensure compliance.  This only denotes the appropriateness of proper admission status and does not reflect the quality of care rendered.         The definitions of Inpatient Status and Observation Status used in making the determination above are those provided in the CMS Coverage Manual, Chapter 1 and Chapter 6, section 70.4.      Sincerely,     Sid Huertas MD    Utilization Review  Physician Advisor  [facility].

## 2022-02-16 NOTE — PLAN OF CARE
DX: Hydronephrosis  Tele: na  A&O x4  Activity: independent  Diet: Reg, then NPO except meds/ice after midnight  VSS: Q4  O2: RA 99%  BG: na  PIV: NS 100ml LH  Pain: 2 abd and r-flank,  Refused icy hot patch  GI/: straining urine, 1 bm, PVR 52mL  Labs: am bmp and cbc scheduled  Plan: Possible renal scan  Discharge: TBD continue plan of care  Pt had a shower today and linens changed        Pg md [id number]  Pt is wanting to eat , is NPO can his diet be changed? Changed diet to regular per md order, will be npo at midnight

## 2022-02-16 NOTE — PROGRESS NOTES
[facility] Urology Progress Note          Assessment and Plan:     Assessment:    Hydronephrosis, unspecified hydronephrosis type, right    Constipation      Plan:   -Discussed possible replacement of indwelling ureteral stent or placement of nephrostomy tube for hydronephrosis.  Patient notes that he has had discomfort during the entire time of stent placement and does not want another stent.  He also does not want to undergo nephrostomy tube placement.  Still declines any intervention.  -Flomax 0.04 mg daily.    -Continue NPO, until MD sees.  -Continue aggressive bowel regimen due to significant constipation and possible colitis on CT.   -Possible renal scan for split function and to check for obstruction.  Has baseline hydronephrosis.  -If patient continues to refuse intervention, may not be able to justify keeping patient.    -Post void bladder scan to ensure patient is emptying.    Windy Lazar PA-C   [facility] Urology  [phone]               Interval History:     Pain is better, currently 2/10.  Tmax 99.  Now afebrile.  No hematuria or dysuria.  Endorses nausea and vomiting.  Denies F/C/SOB/CP.  Still no BM even with Mg Citrate and Miralax.  WBC 3.9 (3.1).  Creatinine 0.85 eGFR >90 (0.90 eGFR >90).  Hemoglobin 12.9. UC: no growth.              Review of Systems:     The 5 point Review of Systems is negative other than noted in the HPI             Medications:     Current Facility-Administered Medications Ordered in Epic   Medication Dose Route Frequency Last Rate Last Admin     acetaminophen (TYLENOL) tablet 975 mg  975 mg Oral Q8H   975 mg at 02/16/22 0551     HYDROmorphone (DILAUDID) injection 0.2 mg  0.2 mg Intravenous Q2H PRN   0.2 mg at 02/15/22 1913     hydrOXYzine (ATARAX) tablet 25 mg  25 mg Oral Q6H PRN         ibuprofen (ADVIL/MOTRIN) tablet 400 mg  400 mg Oral Q6H PRN   400 mg at 02/15/22 1329     lactated ringers infusion   Intravenous Continuous 150 mL/hr at 02/16/22 4155  Rate Verify at 02/16/22 0739     lidocaine (LMX4) cream   Topical Q1H PRN         lidocaine 1 % 0.1-1 mL  0.1-1 mL Other Q1H PRN         LORazepam (ATIVAN) injection 0.5 mg  0.5 mg Intravenous Q6H PRN   0.5 mg at 02/15/22 2343     melatonin tablet 1 mg  1 mg Oral At Bedtime PRN   1 mg at 02/15/22 0109     menthol (ICY HOT) 5 % patch 1 patch  1 patch Topical Q8H PRN   1 patch at 02/15/22 2344    And     menthol (ICY HOT) Patch in Place   Transdermal Q8H         naloxone (NARCAN) injection 0.2 mg  0.2 mg Intravenous Q2 Min PRN        Or     naloxone (NARCAN) injection 0.4 mg  0.4 mg Intravenous Q2 Min PRN        Or     naloxone (NARCAN) injection 0.2 mg  0.2 mg Intramuscular Q2 Min PRN        Or     naloxone (NARCAN) injection 0.4 mg  0.4 mg Intramuscular Q2 Min PRN         ondansetron (ZOFRAN-ODT) ODT tab 4 mg  4 mg Oral Q6H PRN   4 mg at 02/15/22 0505    Or     ondansetron (ZOFRAN) injection 4 mg  4 mg Intravenous Q6H PRN   4 mg at 02/15/22 2107     oxyCODONE (ROXICODONE) tablet 5 mg  5 mg Oral Q4H PRN   5 mg at 02/15/22 2107     polyethylene glycol (MIRALAX) Packet 17 g  17 g Oral Daily   17 g at 02/16/22 0743     prochlorperazine (COMPAZINE) injection 10 mg  10 mg Intravenous Q6H PRN   10 mg at 02/15/22 2159    Or     prochlorperazine (COMPAZINE) tablet 10 mg  10 mg Oral Q6H PRN   10 mg at 02/14/22 2318    Or     prochlorperazine (COMPAZINE) suppository 25 mg  25 mg Rectal Q12H PRN         senna-docusate (SENOKOT-S/PERICOLACE) 8.6-50 MG per tablet 1 tablet  1 tablet Oral BID PRN        Or     senna-docusate (SENOKOT-S/PERICOLACE) 8.6-50 MG per tablet 2 tablet  2 tablet Oral BID PRN         sodium chloride (PF) 0.9% PF flush 3 mL  3 mL Intracatheter q1 min prn         sodium chloride (PF) 0.9% PF flush 3 mL  3 mL Intracatheter Q8H   3 mL at 02/14/22 2124     tamsulosin (FLOMAX) capsule 0.4 mg  0.4 mg Oral Daily   0.4 mg at 02/16/22 0743     No current [facility]-ordered outpatient medications on file.                   Physical Exam:   Vitals were reviewed  Patient Vitals for the past 8 hrs:   BP Temp Temp src Pulse Resp SpO2   02/16/22 0739 124/80 98.7  F (37.1  C) Oral 68 16 100 %     GEN: NAD, lying in bed  EYES: EOMI  MOUTH: MMM  NECK: Supple  RESP: Unlabored breathing  SKIN: Warm  ABD: soft, tender RLQ and RUQ  NEURO: AAO  URO: Urinating on own without hematurira           Data:   No results found for: NTBNPI, NTBNP  Lab Results   Component Value Date    WBC 3.9 (L) 02/16/2022    WBC 3.1 (L) 02/15/2022    WBC 6.3 02/14/2022    HGB 12.9 (L) 02/16/2022    HGB 12.5 (L) 02/15/2022    HGB 13.2 (L) 02/14/2022    HCT 38.6 (L) 02/16/2022    HCT 38.1 (L) 02/15/2022    HCT 39.3 (L) 02/14/2022    MCV 88 02/16/2022    MCV 88 02/15/2022    MCV 87 02/14/2022     02/16/2022     02/15/2022     02/14/2022     Urine culture: No growth.    CT Abdomen Pelvis w/o Contrast    Result Date: 2/16/2022  CT ABDOMEN AND PELVIS WITHOUT CONTRAST   2/16/2022 8:36 AM HISTORY: Flank pain, kidney stone suspected. TECHNIQUE: Noncontrast CT abdomen and pelvis was performed. Radiation dose for this scan was reduced using automated exposure control, adjustment of the mA and/or kV according to patient size, or iterative reconstruction technique. COMPARISON: None available. FINDINGS: Lower chest: Lung bases are clear. Right kidney: Multiple kidney stones measure up to 7 mm upper pole (series 3 image 48). There is diffuse moderate to severe hydroureter/hydronephrosis with no evidence of ureteral stone visualized. Left kidney: No radiodense kidney/ureteral stones or hydronephrosis. Urinary bladder: Distended and unremarkable. Remainder of the abdomen and pelvis: Limited evaluation of the abdominal organs due to lack of IV contrast. Hepatobiliary: The unenhanced liver and gallbladder are grossly unremarkable. Pancreas: The unenhanced pancreas is unremarkable. Spleen: No splenomegaly. Adrenal glands: No adrenal nodules. Bowels: No abnormally  dilated bowel loops. Moderate amount of stool in the colon including inspissated stool in the sigmoid colon and rectum with mild associated colonic/rectal wall thickening. Peritoneum: No significant free fluid in the abdomen or pelvis. No free peritoneal or portal venous gas. Pelvic organs: Unremarkable. Vascular: Unremarkable. Lymph nodes: No significant abdominal or pelvic lymphadenopathy. Bones and soft tissue: No suspicious osseous lesion.      IMPRESSION: 1. Diffuse moderate to severe right hydroureter/hydronephrosis, with no evidence of radiodense ureteral stone causing the obstruction. Multiple right kidney stones measure up to 7 mm also noted. No left kidney/ureteral stones or hydronephrosis. 2. Moderate amount of stool in the colon including inspissated stool in the sigmoid colon and rectum with associated mild colonic/rectal wall thickening, could represent mild colitis. TYRONE NOVA MD   SYSTEM ID:  [id number]    KUB XR    Result Date: 2/12/2022  EXAM: XR KUB LOCATION: [facility] DATE/TIME: 2/12/2022 8:02 PM INDICATION: Hematuria. COMPARISON: None.     IMPRESSION: A right double-J ureteral stent is in place. A 0.8 cm stone or cluster of stones is noted in the upper pole of the right kidney. No other convincing urinary calculi are seen. Moderate amount of stool throughout the colon.

## 2022-02-17 VITALS
RESPIRATION RATE: 18 BRPM | HEART RATE: 73 BPM | TEMPERATURE: 98.1 F | SYSTOLIC BLOOD PRESSURE: 121 MMHG | OXYGEN SATURATION: 98 % | DIASTOLIC BLOOD PRESSURE: 74 MMHG

## 2022-02-17 PROCEDURE — 99231 SBSQ HOSP IP/OBS SF/LOW 25: CPT | Performed by: PHYSICIAN ASSISTANT

## 2022-02-17 PROCEDURE — 99217 PR OBSERVATION CARE DISCHARGE: CPT | Performed by: INTERNAL MEDICINE

## 2022-02-17 PROCEDURE — 250N000013 HC RX MED GY IP 250 OP 250 PS 637: Performed by: PHYSICIAN ASSISTANT

## 2022-02-17 PROCEDURE — G0378 HOSPITAL OBSERVATION PER HR: HCPCS

## 2022-02-17 PROCEDURE — 250N000013 HC RX MED GY IP 250 OP 250 PS 637: Performed by: STUDENT IN AN ORGANIZED HEALTH CARE EDUCATION/TRAINING PROGRAM

## 2022-02-17 RX ORDER — AMOXICILLIN 250 MG
1 CAPSULE ORAL 2 TIMES DAILY PRN
Qty: 30 TABLET | Refills: 0 | Status: SHIPPED | OUTPATIENT
Start: 2022-02-17

## 2022-02-17 RX ORDER — IBUPROFEN 400 MG/1
400 TABLET, FILM COATED ORAL EVERY 6 HOURS PRN
COMMUNITY
Start: 2022-02-17

## 2022-02-17 RX ORDER — TAMSULOSIN HYDROCHLORIDE 0.4 MG/1
0.4 CAPSULE ORAL DAILY
Qty: 30 CAPSULE | Refills: 0 | Status: SHIPPED | OUTPATIENT
Start: 2022-02-18

## 2022-02-17 RX ADMIN — TAMSULOSIN HYDROCHLORIDE 0.4 MG: 0.4 CAPSULE ORAL at 09:08

## 2022-02-17 RX ADMIN — POLYETHYLENE GLYCOL 3350 17 G: 17 POWDER, FOR SOLUTION ORAL at 09:09

## 2022-02-17 NOTE — PROGRESS NOTES
Vital Signs: WNL. Patient afebrile.   Pain/Comfort: patient stating that he is in no pain at this time. Patient encouraged to call RN if patient starts having increased pain. Patient stated an understanding.   Assessment: WNL. PIV site c/d/i with IVF infusing per MAR.   Diet: patient NPO at 0000. Patient reminded multiple times and patient stating an understanding.   Output: patient voiding independently. Patient reminded to void in urinal so urine can be strained. Patient continuing to void in toilet - unable to strain.   Activity/Ambulation: patient up independently in room.   Social: patient calm and cooperative.   Plan: Pain control. Monitor I&O. NPO at 0000. Maintain PIV. IVF.

## 2022-02-17 NOTE — DISCHARGE SUMMARY
[facility]  Hospitalist Discharge Summary      Date of Admission:  2/14/2022  Date of Discharge:  2/17/2022  Discharging Provider: Byron Cardenas DO  Discharge Service: Hospitalist Service    Discharge Diagnoses   1.  Right hydronephrosis.  2.  Hematuria.    Follow-ups Needed After Discharge   Follow-up Appointments     Follow-up and recommended labs and tests       Follow-up with urology within 7 days.  Basic metabolic panel within 7   days.             Discharge Disposition   Discharged to home  Condition at discharge: Stable    Hospital Course   Brendan Hunt is a 22 year old male with a PMH significant for migraines and kidney stones with recent ureteral stent placement on 2/9 who presents with intractable right mid abdominal and flank pain associated with nausea and hematuria.  Found to have moderate to severe right hydronephrosis.  Started on continuous IV fluids and tamsulosin 0.4 mg a day.  Seen in consultation by urology.  Symptoms much improved by time of discharge.  Will need to follow-up with urology within the next 7 days.  Recheck metabolic panel at that time.  Hemoglobin has been quite stable during hospital stay.  Urology to determine timing of next hemoglobin check.    Consultations This Hospital Stay   UROLOGY IP CONSULT    Code Status   Full Code    Time Spent on this Encounter   I spent 25 minutes with Mr. Hunt and working on discharge on 2/17/2022.       Byron Cardenas DO  [facility]  [address]  [facility] [address]  Phone: [phone]  Fax: [phone]  ______________________________________________________________________    Physical Exam   Vital Signs: Temp: 98.1  F (36.7  C) Temp src: Oral BP: 121/74 Pulse: 73   Resp: 18 SpO2: 98 % O2 Device: None (Room air)    Weight: 0 lbs 0 oz  Gen:  NAD, A&Ox3.  Eyes:  PERRL, sclera anicteric.  OP:  MMM, no lesions.  Neck:  Supple.  CV:  Regular, no murmurs.  Lung:  CTA  b/l, normal effort.  Ab:  +BS, soft.  Skin:  Warm, dry to touch.  No rash.  Ext:  No pitting edema LE b/l.         Primary Care Physician   Physician No Ref-Primary    Discharge Orders      Reason for your hospital stay    Hydronephrosis.     Follow-up and recommended labs and tests     Follow-up with urology within 7 days.  Basic metabolic panel within 7 days.     Activity    Your activity upon discharge: activity as tolerated     Diet    Follow this diet upon discharge: Regular         Discharge Medications   Current Discharge Medication List      START taking these medications    Details   ibuprofen (ADVIL/MOTRIN) 400 MG tablet Take 1 tablet (400 mg) by mouth every 6 hours as needed for moderate pain    Associated Diagnoses: Hydronephrosis, unspecified hydronephrosis type      senna-docusate (SENOKOT-S/PERICOLACE) 8.6-50 MG tablet Take 1 tablet by mouth 2 times daily as needed for constipation  Qty: 30 tablet, Refills: 0    Associated Diagnoses: Hydronephrosis, unspecified hydronephrosis type      tamsulosin (FLOMAX) 0.4 MG capsule Take 1 capsule (0.4 mg) by mouth daily  Qty: 30 capsule, Refills: 0    Associated Diagnoses: Hydronephrosis, unspecified hydronephrosis type         CONTINUE these medications which have NOT CHANGED    Details   acetaminophen (TYLENOL) 500 MG tablet Take 1,000 mg by mouth daily as needed for pain      HYDROcodone-acetaminophen (NORCO) 5-325 MG tablet Take 1 tablet by mouth every 6 hours as needed for pain  Qty: 6 tablet, Refills: 0         STOP taking these medications       oxybutynin (DITROPAN) 5 MG tablet Comments:   Reason for Stopping:         oxybutynin ER (DITROPAN-XL) 10 MG 24 hr tablet Comments:   Reason for Stopping:             Allergies   No Known Allergies

## 2022-02-17 NOTE — PROGRESS NOTES
[facility] Urology Progress Note          Assessment and Plan:     Assessment:    Hydronephrosis, unspecified hydronephrosis type, right    Constipation      Plan:   -Discussed possible replacement of indwelling ureteral stent or placement of nephrostomy tube for hydronephrosis.  Declines any intervention at this time.  -Flomax 0.04 mg daily.    -Okay for regular diet.  -Continue aggressive bowel regimen due to significant constipation.  -Okay to discharge from Urology perspective.    -Will need to follow up with Dr. Alfaro as an outpatient.  We will try to coordinate for patient.  -Will sign off.      Windy Lazar PA-C   [facility]  [phone]               Interval History:     Denies any pain.  Tmax 100.8 and reduced without intervention.  No hematuria or dysuria. Denies N/V/F/C/SOB/CP.UC: no growth.  Multiple BM yesterday.              Review of Systems:     The 5 point Review of Systems is negative other than noted in the HPI             Medications:     Current Facility-Administered Medications Ordered in Epic   Medication Dose Route Frequency Last Rate Last Admin     acetaminophen (TYLENOL) tablet 975 mg  975 mg Oral Q8H   975 mg at 02/16/22 2124     HYDROmorphone (DILAUDID) injection 0.2 mg  0.2 mg Intravenous Q2H PRN   0.2 mg at 02/15/22 2159     hydrOXYzine (ATARAX) tablet 25 mg  25 mg Oral Q6H PRN         ibuprofen (ADVIL/MOTRIN) tablet 400 mg  400 mg Oral Q6H PRN   400 mg at 02/15/22 1329     lidocaine (LMX4) cream   Topical Q1H PRN         lidocaine 1 % 0.1-1 mL  0.1-1 mL Other Q1H PRN         LORazepam (ATIVAN) injection 0.5 mg  0.5 mg Intravenous Q6H PRN   0.5 mg at 02/15/22 2343     melatonin tablet 1 mg  1 mg Oral At Bedtime PRN   1 mg at 02/15/22 0109     menthol (ICY HOT) 5 % patch 1 patch  1 patch Topical Q8H PRN   1 patch at 02/15/22 2344    And     menthol (ICY HOT) Patch in Place   Transdermal Q8H         naloxone (NARCAN) injection 0.2 mg  0.2 mg Intravenous Q2  Min PRN        Or     naloxone (NARCAN) injection 0.4 mg  0.4 mg Intravenous Q2 Min PRN        Or     naloxone (NARCAN) injection 0.2 mg  0.2 mg Intramuscular Q2 Min PRN        Or     naloxone (NARCAN) injection 0.4 mg  0.4 mg Intramuscular Q2 Min PRN         ondansetron (ZOFRAN-ODT) ODT tab 4 mg  4 mg Oral Q6H PRN   4 mg at 02/15/22 0505    Or     ondansetron (ZOFRAN) injection 4 mg  4 mg Intravenous Q6H PRN   4 mg at 02/15/22 2107     oxyCODONE (ROXICODONE) tablet 5 mg  5 mg Oral Q4H PRN   5 mg at 02/15/22 2107     polyethylene glycol (MIRALAX) Packet 17 g  17 g Oral Daily   17 g at 02/17/22 0909     prochlorperazine (COMPAZINE) injection 10 mg  10 mg Intravenous Q6H PRN   10 mg at 02/15/22 2159    Or     prochlorperazine (COMPAZINE) tablet 10 mg  10 mg Oral Q6H PRN   10 mg at 02/14/22 2318    Or     prochlorperazine (COMPAZINE) suppository 25 mg  25 mg Rectal Q12H PRN         senna-docusate (SENOKOT-S/PERICOLACE) 8.6-50 MG per tablet 1 tablet  1 tablet Oral BID PRN        Or     senna-docusate (SENOKOT-S/PERICOLACE) 8.6-50 MG per tablet 2 tablet  2 tablet Oral BID PRN         sodium chloride (PF) 0.9% PF flush 3 mL  3 mL Intracatheter q1 min prn         sodium chloride (PF) 0.9% PF flush 3 mL  3 mL Intracatheter Q8H   3 mL at 02/14/22 2124     sodium chloride 0.9% infusion   Intravenous Continuous 100 mL/hr at 02/16/22 2357 New Bag at 02/16/22 2357     tamsulosin (FLOMAX) capsule 0.4 mg  0.4 mg Oral Daily   0.4 mg at 02/17/22 0908     No current Epic-ordered outpatient medications on file.                  Physical Exam:   Vitals were reviewed  Patient Vitals for the past 8 hrs:   BP Temp Temp src Pulse Resp SpO2   02/17/22 0910 121/74 98.1  F (36.7  C) Oral 73 18 98 %     GEN: NAD, lying in bed  EYES: EOMI  MOUTH: MMM  NECK: Supple  RESP: Unlabored breathing, RA  SKIN: Warm  NEURO: AAO  URO: Urinating on own.           Data:   No results found for: NTBNPI, NTBNP  Lab Results   Component Value Date    WBC 3.9 (L)  02/16/2022    WBC 3.1 (L) 02/15/2022    WBC 6.3 02/14/2022    HGB 12.9 (L) 02/16/2022    HGB 12.5 (L) 02/15/2022    HGB 13.2 (L) 02/14/2022    HCT 38.6 (L) 02/16/2022    HCT 38.1 (L) 02/15/2022    HCT 39.3 (L) 02/14/2022    MCV 88 02/16/2022    MCV 88 02/15/2022    MCV 87 02/14/2022     02/16/2022     02/15/2022     02/14/2022     Urine culture: No growth.

## 2022-02-17 NOTE — PLAN OF CARE
VSS, afebrile. Transitioned from NPO to regular diet, at a late breakfast and tolerated it well. [place] ready to discharge, so discussed discharge medications, where to pick them up, as well as follow up appointments and scans. He repeated back to be everything I discussed with him and expressed understanding. Pt discharged from floor at 1157. Pt walked downstairs to main entry way where he stated his ride would be.     Radha Sheehan RN

## 2022-02-17 NOTE — PLAN OF CARE
Goal Outcome Evaluation:    Plan of Care Reviewed With: patient     Vital Signs: temp max 100.8 orally, down to 98.2 orally without tyl given, other VSS   Pain/Comfort:Denies any pain when asked but does say he has pain with urination  Assessment: some pain when urinating, urine yellow in color, stable, IV fluids continue at 100/hr  Diet: Eating and drinking well  Activity/Ambulation: up to BR independently  Social:Mother here to visit  Plan: NPO after midnight, will continue to assess pain and temp, strain urine

## 2022-02-18 ENCOUNTER — PATIENT OUTREACH (OUTPATIENT)
Dept: CARE COORDINATION | Facility: CLINIC | Age: 22
End: 2022-02-18
Payer: COMMERCIAL

## 2022-02-18 DIAGNOSIS — Z71.89 OTHER SPECIFIED COUNSELING: ICD-10-CM

## 2022-02-21 ENCOUNTER — MEDICAL CORRESPONDENCE (OUTPATIENT)
Dept: HEALTH INFORMATION MANAGEMENT | Facility: CLINIC | Age: 22
End: 2022-02-21
Payer: COMMERCIAL

## 2022-03-03 ENCOUNTER — PRE VISIT (OUTPATIENT)
Dept: UROLOGY | Facility: CLINIC | Age: 22
End: 2022-03-03
Payer: COMMERCIAL

## 2022-03-03 NOTE — TELEPHONE ENCOUNTER
Reason for visit: follow up     Dx/Hx/Sx: right renal stone     Records/imaging/labs/orders: in epic     At Rooming: video visit

## 2022-03-23 ENCOUNTER — ANCILLARY PROCEDURE (OUTPATIENT)
Dept: GENERAL RADIOLOGY | Facility: CLINIC | Age: 22
End: 2022-03-23
Attending: UROLOGY
Payer: COMMERCIAL

## 2022-03-23 ENCOUNTER — ANCILLARY PROCEDURE (OUTPATIENT)
Dept: ULTRASOUND IMAGING | Facility: CLINIC | Age: 22
End: 2022-03-23
Attending: UROLOGY
Payer: COMMERCIAL

## 2022-03-23 DIAGNOSIS — N20.0 RIGHT NEPHROLITHIASIS: ICD-10-CM

## 2022-03-23 PROCEDURE — 76770 US EXAM ABDO BACK WALL COMP: CPT | Performed by: RADIOLOGY

## 2022-03-23 PROCEDURE — 74019 RADEX ABDOMEN 2 VIEWS: CPT | Performed by: RADIOLOGY

## 2022-03-25 ENCOUNTER — TELEPHONE (OUTPATIENT)
Dept: UROLOGY | Facility: CLINIC | Age: 22
End: 2022-03-25

## 2022-04-05 ENCOUNTER — OFFICE VISIT (OUTPATIENT)
Dept: UROLOGY | Facility: CLINIC | Age: 22
End: 2022-04-05
Payer: COMMERCIAL

## 2022-04-05 VITALS
BODY MASS INDEX: 21.53 KG/M2 | SYSTOLIC BLOOD PRESSURE: 117 MMHG | WEIGHT: 134 LBS | DIASTOLIC BLOOD PRESSURE: 68 MMHG | HEIGHT: 66 IN | HEART RATE: 86 BPM

## 2022-04-05 DIAGNOSIS — N13.30 HYDRONEPHROSIS, UNSPECIFIED HYDRONEPHROSIS TYPE: ICD-10-CM

## 2022-04-05 DIAGNOSIS — N20.0 KIDNEY STONE: Primary | ICD-10-CM

## 2022-04-05 PROCEDURE — 99213 OFFICE O/P EST LOW 20 MIN: CPT | Performed by: NURSE PRACTITIONER

## 2022-04-05 ASSESSMENT — PAIN SCALES - GENERAL: PAINLEVEL: NO PAIN (0)

## 2022-04-05 NOTE — PATIENT INSTRUCTIONS
UROLOGY CLINIC VISIT PATIENT INSTRUCTIONS    -I have ordered the Litholink 24-hour urine collection study to be done x1. This kit will be sent to your home address. Your at-home kit will include everything you need to complete your 24-hour urine collection(s). Detailed instructions, collection supplies, return shipping box, and a pre-paid Fed-Ex label are being sent directly to you.     If you do not receive the Litholink kit in 7-10 days, please call [phone]. Once you complete the kit and return it, please call and schedule a visit to review the results. Allow 7-10 days for the clinic to receive your Litholink results.     -I have also ordered a Lasix renogram to evaluate your right kidney further.    If you have any issues, questions or concerns in the meantime, do not hesitate to contact us at [phone] or via [employer].     Claudia Damian, CNP  Department of Urology                     Calcium Oxalate Stone Prevention Self Management     Drink more fluids:     Drinking more liquids is the best way you can help prevent future stones. Stones can form when substances in the urine are too concentrated. The more you drink, the more urine you will make. This means all substances in the urine will be less concentrated.     How much urine should I be producing?     The usual recommended daily urine production is about 2 to 3 quarts (9684-7106 ml). If you are producing more than 3 quarts of urine on a regular basis, it is possible to deplete important minerals stored in the body.     To measure the amount of urine you produce in a day, you can either:    Collect all urine in a container and measure at the end of the day     Use a measuring cup each time you urinate and add up the amounts at the end of the day      Observe    Color - Dark gabbi urine is concentrated. Light straw color or lighter is dilute and desirable     Odor - Concentrated urine tends to smell stronger. Dilute urine is nearly odorless      Ways to increase your fluid intake     Increasing the amount of fluid you drink is effective for all types of kidney stones. While water is commonly recommended, all fluids are effective for increasing the amount of urine your body produces.    Focus on starting a lifelong habit, rather than a short-term solution.     Keep liquids on hand that you like. Crystal Light is a low calorie appropriate choice.    Drink out of larger glasses. You'll tend to drink more with each serving.     Have an additional glass of fluid with each meal.     Keep a water or drink bottle at work and fill it regularly.      *If you are prone to fluid retention, consult your doctor before making changes to your fluid habits.     Low Oxalate Diet:     Avoid excess amounts or daily consumption of these foods:    All nuts and nut products including peanuts, almonds, pecans, peanut butter, almond milk    Rhubarb    Chocolate    Soybeans and soy products     Spinach    Wheat Germ    Beets     Maintain a normal calcium diet:     Researches have found that people with low calcium intakes tend to have more stones. Foods with high calcium content are acceptable and include:    Dairy products (including milk, cheese and yogurt)    Meat and fish    Enriched cereals    Dark green vegetables     What about calcium supplements?      Many people take calcium supplements, either on their own or as prescribed by a doctor. Research has indicated that calcium supplements do not usually pose a risk for stone formation.  Calcium citrate is a better choice for a supplement.     Avoid excess salt:     Salt (sodium chloride) is found in abundance in many foods. High sodium levels in the urine can interfere with the kidney's handling of calcium.      Tips for reducing the salt in your diet:    Don't use salt at the table    Reduce the salt used in food preparation. Try 1/2 teaspoon when recipes call for 1 teaspoon.    Use herbs and spices for flavoring instead of  salt.    Avoid salty foods.    Check the label before you buy or use a product. Note sodium and portion size information.    Try to consume less than 2,000 mg/day. (1 teaspoon = 2,000 mg)     Foods with high sodium content include:    Processed meat (including luncheon meats, sausage)     Crackers     Instant cereal     Processed cheese     Canned soups     Chips and snack foods     Soy sauce

## 2022-04-05 NOTE — NURSING NOTE
"Chief Complaint   Patient presents with     Follow Up       Blood pressure 117/68, pulse 86, height 1.676 m (5' 6\"), weight 60.8 kg (134 lb). Body mass index is 21.63 kg/m .    Patient Active Problem List   Diagnosis     Nephrolithiasis     Migraine with aura and without status migrainosus, not intractable     Kidney stone     Hydronephrosis, unspecified hydronephrosis type       No Known Allergies    Current Outpatient Medications   Medication Sig Dispense Refill     acetaminophen (TYLENOL) 500 MG tablet Take 1,000 mg by mouth daily as needed for pain       HYDROcodone-acetaminophen (NORCO) 5-325 MG tablet Take 1 tablet by mouth every 6 hours as needed for pain 6 tablet 0     ibuprofen (ADVIL/MOTRIN) 400 MG tablet Take 1 tablet (400 mg) by mouth every 6 hours as needed for moderate pain       oxyCODONE (ROXICODONE) 5 MG tablet Take 1 tablet (5 mg) by mouth every 6 hours as needed for pain No driving or operating heavy machinery while taking this medication.  You may take a stool softener with this medication as it may cause constipation. 6 tablet 0     senna-docusate (SENOKOT-S/PERICOLACE) 8.6-50 MG tablet Take 1 tablet by mouth 2 times daily as needed for constipation 30 tablet 0     tamsulosin (FLOMAX) 0.4 MG capsule Take 1 capsule (0.4 mg) by mouth daily 30 capsule 0       Social History     Tobacco Use     Smoking status: Former Smoker     Smokeless tobacco: Never Used   Substance Use Topics     Alcohol use: None     Drug use: None       Angie San  4/5/2022  9:04 AM  "

## 2022-04-05 NOTE — PROGRESS NOTES
"     Assessment and Plan:     Assessment: 22 year old male who underwent URS last month for stone clearance, ultimately with no stones removed as they were contained in diverticulum, who then developed right-sided hydronephrosis after stent removal that has not completely resolved. Unclear if his pain has resolved today- he states that he feels \"back to normal,\" though later mentions ongoing flank pain while walking around at home. Given the ongoing hydro on US two weeks ago, we discussed pursuing a Lasix renogram to evaluate for obstruction. He agrees with this plan.     We also reviewed general recommendations to prevent kidney stones including the followin) Low oxalate diet. We discussed foods that are particularly high in oxalate including spinach, rhubarb, beets, nuts, nut butters, and black teas.     2) Low salt diet. Discussed common foods with high amounts of salt as well as dietary strategies to minimize sodium.     3) High fluid intake. Recommend 3 liters of fluid daily to produce goal of 2.5L of urine.     4) Modest animal protein. Recommend limiting animal protein to one serving or less daily.     5) Normal dietary calcium.    Plan:  -Lasix renogram to evaluate right kidney emptying.   -Litholink x1 and follow up with me to review results.       Claudia Damian, CNP  Department of Urology           Chief Complaint:   URS follow up          History of Present Illness:    Brendan Hunt is a 22 year old male who presents for stone follow up. He is s/p right URS with Dr. Alfaro on 22, initially intended to treat a 3 mm distal ureteral stone, but he did manage to pass this immediately prior to surgery. As he also had right renal stones, URS was pursued anyway to clear these. However, once in his kidney, the visualized stones were noted to be located in a calycel diverticulum, the opening of which was ~6 [place]. Opening the diverticula to perform lithotripsy was considered but ultimately " "deferred given the small opening and unlikelihood that they would pass through. Stones historically composed predominantly of CaP.    Following surgery and stent removal, he developed severe flank pain, with CT obtained on 2/16 showing moderate to severe right hydro with no evidence of ureteral stone. Admitted. Replacing stent versus placing PNT was discussed with him while inpatient but he declined any intervention. Renal scan considered for split renal function and to check for obstruction. Patient with a history of hydronephrosis.     Follow up imaging obtained a few days ago showing decreased, now moderate, right hydronephrosis.    Today, he initially states that he has continued to have flank pain, but later states that he feels \"back to normal\" with no pain. Has been voiding fine with no issues.     Regarding stone prevention, he eats a limited diet. Does not eat meat or dairy products. His diet is primarily rice and salad. Denies intake of high-oxalate foods, including spinach or nuts. Denies a high-salt diet.            Past Medical History:     Past Medical History:   Diagnosis Date     Headache 12/16/2019     Kidney stones      Migraine with aura and without status migrainosus, not intractable 12/16/2019     Nephrolithiasis 12/16/2019            Past Surgical History:     Past Surgical History:   Procedure Laterality Date     COMBINED CYSTOSCOPY, RETROGRADES, URETEROSCOPY, LASER HOLMIUM LITHOTRIPSY URETER(S), INSERT STENT Right 1/2/2022    Procedure: 1. Cystourethroscopy 2. Right ureteroscopy with stone extraction;  Surgeon: Sanna Bartlett MD;  Location: [facility] OR     LASER HOLMIUM LITHOTRIPSY URETER(S), INSERT STENT, COMBINED Right 2/9/2022    Procedure: CYSTOURETEROSCOPY, RIGHT RETROGRADE, URETEROSCOPY, LASER STANDBY, RIGHT URETERAL STENT PLACEMENT;  Surgeon: Curtis Alfaro MD;  Location: [facility]            Medications     Current Outpatient Medications   Medication     acetaminophen (TYLENOL) 500 MG " "tablet     HYDROcodone-acetaminophen (NORCO) 5-325 MG tablet     ibuprofen (ADVIL/MOTRIN) 400 MG tablet     oxyCODONE (ROXICODONE) 5 MG tablet     senna-docusate (SENOKOT-S/PERICOLACE) 8.6-50 MG tablet     tamsulosin (FLOMAX) 0.4 MG capsule     No current facility-administered medications for this visit.            Allergies:   Patient has no known allergies.         Review of Systems:  From intake questionnaire   Negative 14 system review except as noted on HPI, nurse's note.         Physical Exam:   Patient is a 22 year old  male   Vitals: Blood pressure 117/68, pulse 86, height 1.676 m (5' 6\"), weight 60.8 kg (134 lb).  General Appearance Adult: Alert, no acute distress, oriented  Lungs: no respiratory distress, or pursed lip breathing  Heart: No obvious jugular venous distension present  Abdomen: soft, nontender, no organomegaly or masses, Body mass index is 21.63 kg/m .      Labs and Pathology:    I personally reviewed all applicable laboratory data and went over findings with patient  Significant for:    CBC RESULTS:  Recent Labs   Lab Test 02/16/22  0609 02/15/22  0735 02/14/22  1833 01/02/22  0729   WBC 3.9* 3.1* 6.3 4.2   HGB 12.9* 12.5* 13.2* 13.0*    251 266 217        BMP RESULTS:  Recent Labs   Lab Test 02/16/22  0609 02/15/22  0735 02/14/22  1833 01/02/22  0729 12/21/21  0007 06/05/19 2130 03/27/19 2027    138 138 140   < > 139 140   POTASSIUM 4.4 4.0 4.4 4.1   < > 3.3* 3.9   CHLORIDE 106 104 105 108   < > 102 102   CO2 31 33* 29 29   < > 32 27   ANIONGAP 2* 1* 4 3   < > 4 11   * 89 97 93   < > 116* 95   BUN 6* 9 10 9   < > 10 9   CR 0.85 0.90 0.80 0.91   < > 0.91 0.97   GFRESTIMATED >90 >90 >90 >90   < > >90 >60   GFRESTBLACK  --   --   --   --   --  >90 >60   PARKER 8.4* 8.6 8.8 8.4*   < > 8.8 9.2    < > = values in this interval not displayed.       UA RESULTS:   Recent Labs   Lab Test 02/14/22  1442 02/12/22  1928 01/01/22 1922   SG 1.011 1.013 1.018   URINEPH 7.5* 6.5 7.5* "   NITRITE Negative Negative Negative   RBCU >182* >182* 7*   WBCU 40* >182* <1       CALCIUM RESULTS  Lab Results   Component Value Date    PARKER 8.4 02/16/2022    PARKER 8.6 02/15/2022    PARKER 8.8 02/14/2022    PARKER 8.8 06/05/2019         Imaging:    I personally reviewed all applicable imaging and went over findings with patient.  Significant for:    Results for orders placed or performed in visit on 03/23/22   X-Ray KUB    Narrative    Exam: XR KUB, 3/23/2022 2:26 PM    Indication: Right nephrolithiasis    Comparison: 2/16/2022, 2/14/2022, 2/12/2022    Findings:   Supine frontal view of the abdomen. Air-filled, nondilated loops of  small and large bowel. No appreciable pneumatosis. Possible residual  cluster of stones projecting over the expected location of the upper  pole of the right kidney, though the stool-filled colon projects over  this region as well. No new focal calcification.      Impression    Impression: Possible residual cluster of stones in the upper pole of  the right kidney, though these radiodensities may represent stool.    MIRELA HANLEY,          SYSTEM ID:  [id number]

## 2022-04-05 NOTE — LETTER
"2022       RE: Brendan Hunt  [address]  [address]  [facility] [address]     Dear Colleague,    Thank you for referring your patient, Brendan Hunt, to the [facility] [place] [facility]. Please see a copy of my visit note below.         Assessment and Plan:     Assessment: 22 year old male who underwent URS last month for stone clearance, ultimately with no stones removed as they were contained in diverticulum, who then developed right-sided hydronephrosis after stent removal that has not completely resolved. Unclear if his pain has resolved today- he states that he feels \"back to normal,\" though later mentions ongoing flank pain while walking around at home. Given the ongoing hydro on US two weeks ago, we discussed pursuing a Lasix renogram to evaluate for obstruction. He agrees with this plan.     We also reviewed general recommendations to prevent kidney stones including the followin) Low oxalate diet. We discussed foods that are particularly high in oxalate including spinach, rhubarb, beets, nuts, nut butters, and black teas.     2) Low salt diet. Discussed common foods with high amounts of salt as well as dietary strategies to minimize sodium.     3) High fluid intake. Recommend 3 liters of fluid daily to produce goal of 2.5L of urine.     4) Modest animal protein. Recommend limiting animal protein to one serving or less daily.     5) Normal dietary calcium.    Plan:  -Lasix renogram to evaluate right kidney emptying.   -Litholink x1 and follow up with me to review results.       Claudia Damian, CNP  Department of Urology           Chief Complaint:   URS follow up          History of Present Illness:    Brendan Hunt is a 22 year old male who presents for stone follow up. He is s/p right URS with Dr. Alfaro on 22, initially intended to treat a 3 mm distal ureteral stone, but he did manage to pass this " "immediately prior to surgery. As he also had right renal stones, URS was pursued anyway to clear these. However, once in his kidney, the visualized stones were noted to be located in a calycel diverticulum, the opening of which was ~6 Turkish. Opening the diverticula to perform lithotripsy was considered but ultimately deferred given the small opening and unlikelihood that they would pass through. Stones historically composed predominantly of CaP.    Following surgery and stent removal, he developed severe flank pain, with CT obtained on 2/16 showing moderate to severe right hydro with no evidence of ureteral stone. Admitted. Replacing stent versus placing PNT was discussed with him while inpatient but he declined any intervention. Renal scan considered for split renal function and to check for obstruction. Patient with a history of hydronephrosis.     Follow up imaging obtained a few days ago showing decreased, now moderate, right hydronephrosis.    Today, he initially states that he has continued to have flank pain, but later states that he feels \"back to normal\" with no pain. Has been voiding fine with no issues.     Regarding stone prevention, he eats a limited diet. Does not eat meat or dairy products. His diet is primarily rice and salad. Denies intake of high-oxalate foods, including spinach or nuts. Denies a high-salt diet.            Past Medical History:     Past Medical History:   Diagnosis Date     Headache 12/16/2019     Kidney stones      Migraine with aura and without status migrainosus, not intractable 12/16/2019     Nephrolithiasis 12/16/2019            Past Surgical History:     Past Surgical History:   Procedure Laterality Date     COMBINED CYSTOSCOPY, RETROGRADES, URETEROSCOPY, LASER HOLMIUM LITHOTRIPSY URETER(S), INSERT STENT Right 1/2/2022    Procedure: 1. Cystourethroscopy 2. Right ureteroscopy with stone extraction;  Surgeon: Sanna Bartlett MD;  Location: [facility] OR     LASER HOLMIUM LITHOTRIPSY " "URETER(S), INSERT STENT, COMBINED Right 2/9/2022    Procedure: CYSTOURETEROSCOPY, RIGHT RETROGRADE, URETEROSCOPY, LASER STANDBY, RIGHT URETERAL STENT PLACEMENT;  Surgeon: Curtis Alfaro MD;  Location: [facility] OR            Medications     Current Outpatient Medications   Medication     acetaminophen (TYLENOL) 500 MG tablet     HYDROcodone-acetaminophen (NORCO) 5-325 MG tablet     ibuprofen (ADVIL/MOTRIN) 400 MG tablet     oxyCODONE (ROXICODONE) 5 MG tablet     senna-docusate (SENOKOT-S/PERICOLACE) 8.6-50 MG tablet     tamsulosin (FLOMAX) 0.4 MG capsule     No current facility-administered medications for this visit.            Allergies:   Patient has no known allergies.         Review of Systems:  From intake questionnaire   Negative 14 system review except as noted on HPI, nurse's note.         Physical Exam:   Patient is a 22 year old  male   Vitals: Blood pressure 117/68, pulse 86, height 1.676 m (5' 6\"), weight 60.8 kg (134 lb).  General Appearance Adult: Alert, no acute distress, oriented  Lungs: no respiratory distress, or pursed lip breathing  Heart: No obvious jugular venous distension present  Abdomen: soft, nontender, no organomegaly or masses, Body mass index is 21.63 kg/m .      Labs and Pathology:    I personally reviewed all applicable laboratory data and went over findings with patient  Significant for:    CBC RESULTS:  Recent Labs   Lab Test 02/16/22  0609 02/15/22  0735 02/14/22  1833 01/02/22  0729   WBC 3.9* 3.1* 6.3 4.2   HGB 12.9* 12.5* 13.2* 13.0*    251 266 217        BMP RESULTS:  Recent Labs   Lab Test 02/16/22  0609 02/15/22  0735 02/14/22  1833 01/02/22  0729 12/21/21  0007 06/05/19  2130 03/27/19 2027    138 138 140   < > 139 140   POTASSIUM 4.4 4.0 4.4 4.1   < > 3.3* 3.9   CHLORIDE 106 104 105 108   < > 102 102   CO2 31 33* 29 29   < > 32 27   ANIONGAP 2* 1* 4 3   < > 4 11   * 89 97 93   < > 116* 95   BUN 6* 9 10 9   < > 10 9   CR 0.85 0.90 0.80 0.91   < > 0.91 " 0.97   GFRESTIMATED >90 >90 >90 >90   < > >90 >60   GFRESTBLACK  --   --   --   --   --  >90 >60   PARKER 8.4* 8.6 8.8 8.4*   < > 8.8 9.2    < > = values in this interval not displayed.       UA RESULTS:   Recent Labs   Lab Test 02/14/22  1442 02/12/22  1928 01/01/22 1922   SG 1.011 1.013 1.018   URINEPH 7.5* 6.5 7.5*   NITRITE Negative Negative Negative   RBCU >182* >182* 7*   WBCU 40* >182* <1       CALCIUM RESULTS  Lab Results   Component Value Date    PARKER 8.4 02/16/2022    PARKER 8.6 02/15/2022    PARKER 8.8 02/14/2022    PARKER 8.8 06/05/2019         Imaging:    I personally reviewed all applicable imaging and went over findings with patient.  Significant for:    Results for orders placed or performed in visit on 03/23/22   X-Ray KUB    Narrative    Exam: XR KUB, 3/23/2022 2:26 PM    Indication: Right nephrolithiasis    Comparison: 2/16/2022, 2/14/2022, 2/12/2022    Findings:   Supine frontal view of the abdomen. Air-filled, nondilated loops of  small and large bowel. No appreciable pneumatosis. Possible residual  cluster of stones projecting over the expected location of the upper  pole of the right kidney, though the stool-filled colon projects over  this region as well. No new focal calcification.      Impression    Impression: Possible residual cluster of stones in the upper pole of  the right kidney, though these radiodensities may represent stool.    MIRELA HANLEY DO         SYSTEM ID:  [id number]           Again, thank you for allowing me to participate in the care of your patient.      Sincerely,    Claudia Damian, CNP       No

## 2022-04-05 NOTE — LETTER
Date:April 6, 2022      Provider requested that no letter be sent. Do not send.       [facility]

## 2022-04-06 ENCOUNTER — HOSPITAL ENCOUNTER (OUTPATIENT)
Dept: NUCLEAR MEDICINE | Facility: CLINIC | Age: 22
Setting detail: NUCLEAR MEDICINE
Discharge: HOME | End: 2022-04-06
Attending: NURSE PRACTITIONER | Admitting: NURSE PRACTITIONER
Payer: COMMERCIAL

## 2022-04-06 DIAGNOSIS — N13.30 HYDRONEPHROSIS, UNSPECIFIED HYDRONEPHROSIS TYPE: ICD-10-CM

## 2022-04-06 PROCEDURE — 78708 K FLOW/FUNCT IMAGE W/DRUG: CPT | Mod: 26 | Performed by: STUDENT IN AN ORGANIZED HEALTH CARE EDUCATION/TRAINING PROGRAM

## 2022-04-06 PROCEDURE — 250N000011 HC RX IP 250 OP 636: Performed by: NURSE PRACTITIONER

## 2022-04-06 PROCEDURE — A9562 TC99M MERTIATIDE: HCPCS | Performed by: NURSE PRACTITIONER

## 2022-04-06 PROCEDURE — 343N000001 HC RX 343: Performed by: NURSE PRACTITIONER

## 2022-04-06 PROCEDURE — 78708 K FLOW/FUNCT IMAGE W/DRUG: CPT

## 2022-04-06 RX ORDER — FUROSEMIDE 10 MG/ML
20-40 INJECTION INTRAMUSCULAR; INTRAVENOUS ONCE
Status: COMPLETED | OUTPATIENT
Start: 2022-04-06 | End: 2022-04-06

## 2022-04-06 RX ADMIN — FUROSEMIDE 20 MG: 10 INJECTION, SOLUTION INTRAMUSCULAR; INTRAVENOUS at 14:04

## 2022-04-06 RX ADMIN — TECHNESCAN TC 99M MERTIATIDE 9.8 MILLICURIE: 1 INJECTION, POWDER, LYOPHILIZED, FOR SOLUTION INTRAVENOUS at 14:04

## 2022-04-08 ENCOUNTER — MEDICAL CORRESPONDENCE (OUTPATIENT)
Dept: HEALTH INFORMATION MANAGEMENT | Facility: CLINIC | Age: 22
End: 2022-04-08
Payer: COMMERCIAL

## 2022-04-19 ENCOUNTER — PATIENT OUTREACH (OUTPATIENT)
Dept: UROLOGY | Facility: CLINIC | Age: 22
End: 2022-04-19
Payer: COMMERCIAL

## 2022-04-19 NOTE — TELEPHONE ENCOUNTER
Pt has phone set to not allow any incoming calls. Provider and nurse have attempted to reach this patient several times, no change in phone status. Letter sent today requesting patient call to nurse direct to discuss imaging results and to set up follow up visit for litholink results and to discuss imaging findings in more detail.     When patient returns call to clinic, message from Claudia Damian is that there continues to be some swelling on his kidney, but this does not appear to be the result of anything fixable- he just has a big ureter which allows for backwards flow. He should then follow up with me to review Litholink results and I can always discuss this with him further at that time.

## 2022-05-29 NOTE — PLAN OF CARE
Orientation: Alert and oriented x4  VSS. 99% on RA. Afebrile.   LS: clear and equal bilaterally.   GI: Passing gas. no BM. Nausea. Emesis x1. Improvement with ativan x1  : Adequate urine output.   Skin: intact. No apparent issues  Activity: Independent. Pt slept comfortably throughout shift.   Pain: 5/10 back/abdomen/right flank pain. Well controlled with PRN interventions. Ativan x1 for spasm pain and nausea. Icy hot patch to lower back.   Updates/Plan: Plan for CT scan later this AM. Encourage activity to promote bowel movement. Continue with daily miralax. Minimize narcotics as able. Continue with current cares.      none

## 2022-12-21 NOTE — PROGRESS NOTES
Clinic Care Coordination Contact  [facility]: Post-Discharge Note  SITUATION                                                      Admission: 2/14/2022      Reason for Admission: Right hydronephrosis.  Discharge: 2/17/2022  Discharge Diagnosis: Right hydronephrosis.    BACKGROUND                                                      Per hospital discharge summary and inpatient provider notes:  22 year old male with a PMH significant for migraines and kidney stones with recent ureteral stent placement on 2/9 who presents with intractable right mid abdominal and flank pain associated with nausea and hematuria.  Found to have moderate to severe right hydronephrosis.  Started on continuous IV fluids and tamsulosin 0.4 mg a day.  Seen in consultation by urology.  Symptoms much improved by time of discharge.  Will need to follow-up with urology within the next 7 days.  Recheck metabolic panel at that time.  Hemoglobin has been quite stable during hospital stay.  Urology to determine timing of next hemoglobin check.    ASSESSMENT      Enrollment  Primary Care Care Coordination Status: Not a Candidate    Discharge Assessment  How are you doing now that you are home?: I am doing fine, much better  How are your symptoms? (Red Flag symptoms escalate to triage hotline per guidelines): Improved  Do you feel your condition is stable enough to be safe at home until your provider visit?: Yes  Does the patient have their discharge instructions? : Yes  Does the patient have questions regarding their discharge instructions? : No  Were you started on any new medications or were there changes to any of your previous medications? : Yes  Does the patient have all of their medications?: Yes  Do you have questions regarding any of your medications? : No  Do you have all of your needed medical supplies or equipment (DME)?  (i.e. oxygen tank, CPAP, cane, etc.):  (NA)  Discharge follow-up appointment scheduled within 14 calendar days? :  No  Is patient agreeable to assistance with scheduling? : No (CTA encouraged him to call and make the appt today)                PLAN                                                      Outpatient Plan:   Follow-up with urology within 7 days.  Basic metabolic panel within 7   days    Future Appointments   Date Time Provider Department Center   3/23/2022  1:00 PM [email or web address]   3/23/2022  1:30 PM [email or web address]R [facility]   3/25/2022  1[facility]30 [facility]         For any urgent concerns, please contact our 24 hour nurse triage line: [phone] ([phone])         Anna Cook MA                 Complex Repair And Rotation Flap Text: The defect edges were debeveled with a #15 scalpel blade.  The primary defect was closed partially with a complex linear closure.  Given the location of the remaining defect, shape of the defect and the proximity to free margins a rotation flap was deemed most appropriate for complete closure of the defect.  Using a sterile surgical marker, an appropriate advancement flap was drawn incorporating the defect and placing the expected incisions within the relaxed skin tension lines where possible.    The area thus outlined was incised deep to adipose tissue with a #15 scalpel blade.  The skin margins were undermined to an appropriate distance in all directions utilizing iris scissors.

## (undated) DEVICE — GLOVE PROTEXIS W/NEU-THERA 7.5  2D73TE75

## (undated) DEVICE — KIT ENDO FIRST STEP DISINFECTANT 200ML W/POUCH EP-4

## (undated) DEVICE — COVER FOOTSWITCH W/CINCH 20X24" 923267

## (undated) DEVICE — PREP SCRUB SOL EXIDINE 4% CHG 4OZ 29002-404

## (undated) DEVICE — SOL NACL 0.9% IRRIG 3000ML BAG 2B7477

## (undated) DEVICE — LINEN TOWEL PACK X5 5464

## (undated) DEVICE — SOL WATER IRRIG 1000ML BOTTLE 2F7114

## (undated) DEVICE — PACK CYSTO CUSTOM ASC

## (undated) DEVICE — RAD RX CONRAY 60% (50ML) CHARGE PER ML

## (undated) DEVICE — TUBING IRRIG TUR Y TYPE 96" LF 6543-01

## (undated) DEVICE — GUIDEWIRE SENSOR DUAL FLEX STR 0.035"X150CM M0066703080

## (undated) DEVICE — GUIDEWIRE URO STR STIFF .035"X150CM NITINOL 150NSS35

## (undated) DEVICE — CATH URETERAL FLEX TIP TIGERTAIL 06FRX70CM 139006

## (undated) DEVICE — DRAPE C-ARM W/STRAPS 42X72" 07-CA104

## (undated) DEVICE — PACK CYSTO CUSTOM RIDGES

## (undated) DEVICE — LINEN FULL SHEET 5511

## (undated) DEVICE — CATH URETERAL OPEN END 5FRX70CM M0064002010

## (undated) DEVICE — PAD CHUX UNDERPAD 30X30"

## (undated) DEVICE — LINEN HALF SHEET 5512

## (undated) DEVICE — PAD CHUX UNDERPAD 23X24" 7136

## (undated) DEVICE — BAG CLEAR TRASH 1.3M 39X33" P4040C

## (undated) DEVICE — TUBING SET THERMEDX UROLOGY SGL USE LL0006

## (undated) DEVICE — SPECIMEN CONTAINER 5OZ STERILE 2600SA

## (undated) RX ORDER — PROPOFOL 10 MG/ML
INJECTION, EMULSION INTRAVENOUS
Status: DISPENSED
Start: 2022-02-09

## (undated) RX ORDER — ACETAMINOPHEN 325 MG/1
TABLET ORAL
Status: DISPENSED
Start: 2022-01-02

## (undated) RX ORDER — DEXAMETHASONE SODIUM PHOSPHATE 4 MG/ML
INJECTION, SOLUTION INTRA-ARTICULAR; INTRALESIONAL; INTRAMUSCULAR; INTRAVENOUS; SOFT TISSUE
Status: DISPENSED
Start: 2022-02-09

## (undated) RX ORDER — FENTANYL CITRATE 50 UG/ML
INJECTION, SOLUTION INTRAMUSCULAR; INTRAVENOUS
Status: DISPENSED
Start: 2022-02-09

## (undated) RX ORDER — LIDOCAINE HYDROCHLORIDE 20 MG/ML
INJECTION, SOLUTION EPIDURAL; INFILTRATION; INTRACAUDAL; PERINEURAL
Status: DISPENSED
Start: 2022-02-09

## (undated) RX ORDER — FENTANYL CITRATE 50 UG/ML
INJECTION, SOLUTION INTRAMUSCULAR; INTRAVENOUS
Status: DISPENSED
Start: 2022-01-02

## (undated) RX ORDER — OXYCODONE HYDROCHLORIDE 5 MG/1
TABLET ORAL
Status: DISPENSED
Start: 2022-02-09

## (undated) RX ORDER — FENTANYL CITRATE-0.9 % NACL/PF 10 MCG/ML
PLASTIC BAG, INJECTION (ML) INTRAVENOUS
Status: DISPENSED
Start: 2022-01-02

## (undated) RX ORDER — FENTANYL CITRATE-0.9 % NACL/PF 10 MCG/ML
PLASTIC BAG, INJECTION (ML) INTRAVENOUS
Status: DISPENSED
Start: 2022-02-09

## (undated) RX ORDER — ONDANSETRON 2 MG/ML
INJECTION INTRAMUSCULAR; INTRAVENOUS
Status: DISPENSED
Start: 2022-01-02

## (undated) RX ORDER — KETOROLAC TROMETHAMINE 30 MG/ML
INJECTION, SOLUTION INTRAMUSCULAR; INTRAVENOUS
Status: DISPENSED
Start: 2022-01-02

## (undated) RX ORDER — CEFAZOLIN SODIUM 2 G/50ML
SOLUTION INTRAVENOUS
Status: DISPENSED
Start: 2022-02-09

## (undated) RX ORDER — FUROSEMIDE 10 MG/ML
INJECTION INTRAMUSCULAR; INTRAVENOUS
Status: DISPENSED
Start: 2022-04-06

## (undated) RX ORDER — PROMETHAZINE HYDROCHLORIDE 25 MG/ML
INJECTION, SOLUTION INTRAMUSCULAR; INTRAVENOUS
Status: DISPENSED
Start: 2022-01-02

## (undated) RX ORDER — CEFAZOLIN SODIUM 1 G/3ML
INJECTION, POWDER, FOR SOLUTION INTRAMUSCULAR; INTRAVENOUS
Status: DISPENSED
Start: 2022-01-02

## (undated) RX ORDER — ONDANSETRON 2 MG/ML
INJECTION INTRAMUSCULAR; INTRAVENOUS
Status: DISPENSED
Start: 2022-02-09

## (undated) RX ORDER — EPHEDRINE SULFATE 50 MG/ML
INJECTION, SOLUTION INTRAVENOUS
Status: DISPENSED
Start: 2022-01-02

## (undated) RX ORDER — GLYCOPYRROLATE 0.2 MG/ML
INJECTION INTRAMUSCULAR; INTRAVENOUS
Status: DISPENSED
Start: 2022-02-09

## (undated) RX ORDER — OXYCODONE HYDROCHLORIDE 5 MG/1
TABLET ORAL
Status: DISPENSED
Start: 2022-01-02